# Patient Record
Sex: FEMALE | Race: WHITE | NOT HISPANIC OR LATINO | URBAN - METROPOLITAN AREA
[De-identification: names, ages, dates, MRNs, and addresses within clinical notes are randomized per-mention and may not be internally consistent; named-entity substitution may affect disease eponyms.]

---

## 2019-02-06 VITALS
TEMPERATURE: 97.4 F | DIASTOLIC BLOOD PRESSURE: 102 MMHG | DIASTOLIC BLOOD PRESSURE: 70 MMHG | RESPIRATION RATE: 16 BRPM | HEART RATE: 89 BPM | OXYGEN SATURATION: 99 % | DIASTOLIC BLOOD PRESSURE: 78 MMHG | SYSTOLIC BLOOD PRESSURE: 177 MMHG | OXYGEN SATURATION: 98 % | DIASTOLIC BLOOD PRESSURE: 76 MMHG | RESPIRATION RATE: 24 BRPM | HEIGHT: 62 IN | OXYGEN SATURATION: 97 % | TEMPERATURE: 98 F | DIASTOLIC BLOOD PRESSURE: 81 MMHG | DIASTOLIC BLOOD PRESSURE: 71 MMHG | OXYGEN SATURATION: 100 % | SYSTOLIC BLOOD PRESSURE: 119 MMHG | DIASTOLIC BLOOD PRESSURE: 88 MMHG | RESPIRATION RATE: 17 BRPM | HEART RATE: 73 BPM | HEART RATE: 77 BPM | SYSTOLIC BLOOD PRESSURE: 164 MMHG | WEIGHT: 138 LBS | SYSTOLIC BLOOD PRESSURE: 145 MMHG | SYSTOLIC BLOOD PRESSURE: 144 MMHG | HEART RATE: 83 BPM | HEART RATE: 91 BPM | RESPIRATION RATE: 20 BRPM | SYSTOLIC BLOOD PRESSURE: 136 MMHG | OXYGEN SATURATION: 89 % | SYSTOLIC BLOOD PRESSURE: 156 MMHG | HEART RATE: 79 BPM

## 2019-02-06 PROBLEM — K21.9 GERD - GASTROESOPHAGEAL REFLUX DISEASE: Status: ACTIVE | Noted: 2019-02-07

## 2019-02-06 PROBLEM — K30 DYSPEPSIA: Status: ACTIVE | Noted: 2019-02-07

## 2019-02-07 ENCOUNTER — AMBULATORY SURGICAL CENTER (AMBULATORY)
Dept: URBAN - METROPOLITAN AREA SURGERY 17 | Facility: SURGERY | Age: 79
End: 2019-02-07
Payer: COMMERCIAL

## 2019-02-07 ENCOUNTER — OFFICE (AMBULATORY)
Dept: URBAN - METROPOLITAN AREA PATHOLOGY 4 | Facility: PATHOLOGY | Age: 79
End: 2019-02-07
Payer: COMMERCIAL

## 2019-02-07 DIAGNOSIS — K22.2 ESOPHAGEAL OBSTRUCTION: ICD-10-CM

## 2019-02-07 DIAGNOSIS — K21.9 GASTRO-ESOPHAGEAL REFLUX DISEASE WITHOUT ESOPHAGITIS: ICD-10-CM

## 2019-02-07 DIAGNOSIS — K21.0 GASTRO-ESOPHAGEAL REFLUX DISEASE WITH ESOPHAGITIS: ICD-10-CM

## 2019-02-07 DIAGNOSIS — R13.10 DYSPHAGIA, UNSPECIFIED: ICD-10-CM

## 2019-02-07 DIAGNOSIS — K30 FUNCTIONAL DYSPEPSIA: ICD-10-CM

## 2019-02-07 LAB
GI HISTOLOGY: A. UNSPECIFIED: (no result)
GI HISTOLOGY: PDF REPORT: (no result)

## 2019-02-07 PROCEDURE — 43239 EGD BIOPSY SINGLE/MULTIPLE: CPT | Performed by: INTERNAL MEDICINE

## 2019-02-07 PROCEDURE — 88305 TISSUE EXAM BY PATHOLOGIST: CPT | Performed by: INTERNAL MEDICINE

## 2019-02-07 PROCEDURE — 43450 DILATE ESOPHAGUS 1/MULT PASS: CPT | Performed by: INTERNAL MEDICINE

## 2019-02-27 ENCOUNTER — OFFICE (AMBULATORY)
Dept: URBAN - METROPOLITAN AREA CLINIC 75 | Facility: CLINIC | Age: 79
End: 2019-02-27
Payer: COMMERCIAL

## 2019-02-27 VITALS
SYSTOLIC BLOOD PRESSURE: 130 MMHG | DIASTOLIC BLOOD PRESSURE: 70 MMHG | HEART RATE: 70 BPM | HEIGHT: 62 IN | WEIGHT: 143 LBS

## 2019-02-27 DIAGNOSIS — K21.9 GASTRO-ESOPHAGEAL REFLUX DISEASE WITHOUT ESOPHAGITIS: ICD-10-CM

## 2019-02-27 DIAGNOSIS — R49.0 DYSPHONIA: ICD-10-CM

## 2019-02-27 DIAGNOSIS — K22.2 ESOPHAGEAL OBSTRUCTION: ICD-10-CM

## 2019-02-27 PROBLEM — R13.10 DYSPHAGIA: Status: ACTIVE | Noted: 2019-02-07

## 2019-02-27 PROCEDURE — 99214 OFFICE O/P EST MOD 30 MIN: CPT

## 2019-02-27 RX ORDER — RANITIDINE 150 MG/1
300 TABLET ORAL
Qty: 180 | Refills: 3 | Status: ACTIVE
Start: 2019-02-27

## 2021-02-05 ENCOUNTER — IMMUNIZATION (OUTPATIENT)
Dept: VACCINE CLINIC | Facility: HOSPITAL | Age: 81
End: 2021-02-05

## 2021-02-05 PROCEDURE — 91300 HC SARSCOV02 VAC 30MCG/0.3ML IM: CPT | Performed by: INTERNAL MEDICINE

## 2021-02-05 PROCEDURE — 0001A: CPT | Performed by: INTERNAL MEDICINE

## 2021-02-26 ENCOUNTER — IMMUNIZATION (OUTPATIENT)
Dept: VACCINE CLINIC | Facility: HOSPITAL | Age: 81
End: 2021-02-26

## 2021-02-26 PROCEDURE — 0002A: CPT | Performed by: INTERNAL MEDICINE

## 2021-02-26 PROCEDURE — 91300 HC SARSCOV02 VAC 30MCG/0.3ML IM: CPT | Performed by: INTERNAL MEDICINE

## 2021-08-10 ENCOUNTER — TRANSCRIBE ORDERS (OUTPATIENT)
Dept: ADMINISTRATIVE | Facility: HOSPITAL | Age: 81
End: 2021-08-10

## 2021-08-10 DIAGNOSIS — Z13.6 ENCOUNTER FOR SCREENING FOR VASCULAR DISEASE: Primary | ICD-10-CM

## 2021-09-11 ENCOUNTER — IMMUNIZATION (OUTPATIENT)
Dept: VACCINE CLINIC | Facility: HOSPITAL | Age: 81
End: 2021-09-11

## 2021-09-11 PROCEDURE — 91300 HC SARSCOV02 VAC 30MCG/0.3ML IM: CPT | Performed by: INTERNAL MEDICINE

## 2021-09-11 PROCEDURE — 0003A: CPT | Performed by: INTERNAL MEDICINE

## 2022-01-11 ENCOUNTER — HOSPITAL ENCOUNTER (OUTPATIENT)
Dept: CARDIOLOGY | Facility: HOSPITAL | Age: 82
Discharge: HOME | End: 2022-01-11
Admitting: SURGERY

## 2022-01-11 VITALS
HEART RATE: 91 BPM | BODY MASS INDEX: 26.62 KG/M2 | DIASTOLIC BLOOD PRESSURE: 78 MMHG | SYSTOLIC BLOOD PRESSURE: 131 MMHG | WEIGHT: 141 LBS | HEIGHT: 61 IN

## 2022-01-11 DIAGNOSIS — Z13.6 ENCOUNTER FOR SCREENING FOR VASCULAR DISEASE: ICD-10-CM

## 2022-01-11 LAB
BH CV ECHO MEAS - DIST AO DIAM: 1.35 CM
BH CV VAS BP LEFT ARM: NORMAL MMHG
BH CV VAS BP RIGHT ARM: NORMAL MMHG
BH CV XLRA MEAS - MID AO DIAM: 1.59 CM
BH CV XLRA MEAS - PAD LEFT ABI DP: 1.05
BH CV XLRA MEAS - PAD LEFT ABI PT: 1.13
BH CV XLRA MEAS - PAD LEFT ARM: 131 MMHG
BH CV XLRA MEAS - PAD LEFT LEG DP: 138 MMHG
BH CV XLRA MEAS - PAD LEFT LEG PT: 148 MMHG
BH CV XLRA MEAS - PAD RIGHT ABI DP: 1.07
BH CV XLRA MEAS - PAD RIGHT ABI PT: 1.14
BH CV XLRA MEAS - PAD RIGHT ARM: 130 MMHG
BH CV XLRA MEAS - PAD RIGHT LEG DP: 141 MMHG
BH CV XLRA MEAS - PAD RIGHT LEG PT: 150 MMHG
BH CV XLRA MEAS - PROX AO DIAM: 1.82 CM
BH CV XLRA MEAS LEFT ICA/CCA RATIO: 1.01
BH CV XLRA MEAS LEFT MID CCA PSV: NORMAL CM/SEC
BH CV XLRA MEAS LEFT MID ICA PSV: NORMAL CM/SEC
BH CV XLRA MEAS LEFT PROX ECA PSV: NORMAL CM/SEC
BH CV XLRA MEAS RIGHT ICA/CCA RATIO: 0.98
BH CV XLRA MEAS RIGHT MID CCA PSV: NORMAL CM/SEC
BH CV XLRA MEAS RIGHT MID ICA PSV: NORMAL CM/SEC
BH CV XLRA MEAS RIGHT PROX ECA PSV: NORMAL CM/SEC

## 2022-01-11 PROCEDURE — 93799 UNLISTED CV SVC/PROCEDURE: CPT

## 2023-07-13 PROBLEM — Z96.649 HIP JOINT REPLACEMENT STATUS: Status: ACTIVE | Noted: 2023-07-13

## 2024-09-30 ENCOUNTER — HOSPITAL ENCOUNTER (INPATIENT)
Facility: HOSPITAL | Age: 84
LOS: 1 days | Discharge: HOME | DRG: 287 | End: 2024-10-03
Attending: EMERGENCY MEDICINE | Admitting: INTERNAL MEDICINE
Payer: MEDICARE

## 2024-09-30 ENCOUNTER — APPOINTMENT (OUTPATIENT)
Dept: GENERAL RADIOLOGY | Facility: HOSPITAL | Age: 84
DRG: 287 | End: 2024-09-30
Payer: MEDICARE

## 2024-09-30 DIAGNOSIS — R07.9 CHEST PAIN, UNSPECIFIED TYPE: Primary | ICD-10-CM

## 2024-09-30 DIAGNOSIS — R11.0 NAUSEA: ICD-10-CM

## 2024-09-30 DIAGNOSIS — I10 HYPERTENSION, UNSPECIFIED TYPE: ICD-10-CM

## 2024-09-30 LAB
ALBUMIN SERPL-MCNC: 4.3 G/DL (ref 3.5–5.2)
ALBUMIN/GLOB SERPL: 1.6 G/DL
ALP SERPL-CCNC: 51 U/L (ref 39–117)
ALT SERPL W P-5'-P-CCNC: 11 U/L (ref 1–33)
ANION GAP SERPL CALCULATED.3IONS-SCNC: 10.1 MMOL/L (ref 5–15)
AST SERPL-CCNC: 16 U/L (ref 1–32)
BASOPHILS # BLD AUTO: 0.04 10*3/MM3 (ref 0–0.2)
BASOPHILS NFR BLD AUTO: 0.6 % (ref 0–1.5)
BILIRUB SERPL-MCNC: 0.3 MG/DL (ref 0–1.2)
BUN SERPL-MCNC: 16 MG/DL (ref 8–23)
BUN/CREAT SERPL: 13.2 (ref 7–25)
CALCIUM SPEC-SCNC: 9.4 MG/DL (ref 8.6–10.5)
CHLORIDE SERPL-SCNC: 106 MMOL/L (ref 98–107)
CO2 SERPL-SCNC: 24.9 MMOL/L (ref 22–29)
CREAT SERPL-MCNC: 1.21 MG/DL (ref 0.57–1)
DEPRECATED RDW RBC AUTO: 49.4 FL (ref 37–54)
EGFRCR SERPLBLD CKD-EPI 2021: 44.6 ML/MIN/1.73
EOSINOPHIL # BLD AUTO: 0.05 10*3/MM3 (ref 0–0.4)
EOSINOPHIL NFR BLD AUTO: 0.7 % (ref 0.3–6.2)
ERYTHROCYTE [DISTWIDTH] IN BLOOD BY AUTOMATED COUNT: 13.1 % (ref 12.3–15.4)
GEN 5 2HR TROPONIN T REFLEX: 10 NG/L
GLOBULIN UR ELPH-MCNC: 2.7 GM/DL
GLUCOSE SERPL-MCNC: 105 MG/DL (ref 65–99)
HCT VFR BLD AUTO: 41.3 % (ref 34–46.6)
HGB BLD-MCNC: 13 G/DL (ref 12–15.9)
HOLD SPECIMEN: NORMAL
HOLD SPECIMEN: NORMAL
IMM GRANULOCYTES # BLD AUTO: 0.02 10*3/MM3 (ref 0–0.05)
IMM GRANULOCYTES NFR BLD AUTO: 0.3 % (ref 0–0.5)
LYMPHOCYTES # BLD AUTO: 1.17 10*3/MM3 (ref 0.7–3.1)
LYMPHOCYTES NFR BLD AUTO: 17.3 % (ref 19.6–45.3)
MCH RBC QN AUTO: 31.9 PG (ref 26.6–33)
MCHC RBC AUTO-ENTMCNC: 31.5 G/DL (ref 31.5–35.7)
MCV RBC AUTO: 101.5 FL (ref 79–97)
MONOCYTES # BLD AUTO: 0.53 10*3/MM3 (ref 0.1–0.9)
MONOCYTES NFR BLD AUTO: 7.9 % (ref 5–12)
NEUTROPHILS NFR BLD AUTO: 4.94 10*3/MM3 (ref 1.7–7)
NEUTROPHILS NFR BLD AUTO: 73.2 % (ref 42.7–76)
NRBC BLD AUTO-RTO: 0 /100 WBC (ref 0–0.2)
PLATELET # BLD AUTO: 233 10*3/MM3 (ref 140–450)
PMV BLD AUTO: 9.5 FL (ref 6–12)
POTASSIUM SERPL-SCNC: 4.6 MMOL/L (ref 3.5–5.2)
PROT SERPL-MCNC: 7 G/DL (ref 6–8.5)
RBC # BLD AUTO: 4.07 10*6/MM3 (ref 3.77–5.28)
SODIUM SERPL-SCNC: 141 MMOL/L (ref 136–145)
TROPONIN T DELTA: 1 NG/L
TROPONIN T SERPL HS-MCNC: 9 NG/L
WBC NRBC COR # BLD AUTO: 6.75 10*3/MM3 (ref 3.4–10.8)
WHOLE BLOOD HOLD COAG: NORMAL
WHOLE BLOOD HOLD SPECIMEN: NORMAL

## 2024-09-30 PROCEDURE — 84484 ASSAY OF TROPONIN QUANT: CPT | Performed by: EMERGENCY MEDICINE

## 2024-09-30 PROCEDURE — 71045 X-RAY EXAM CHEST 1 VIEW: CPT

## 2024-09-30 PROCEDURE — 93010 ELECTROCARDIOGRAM REPORT: CPT | Performed by: INTERNAL MEDICINE

## 2024-09-30 PROCEDURE — 99285 EMERGENCY DEPT VISIT HI MDM: CPT

## 2024-09-30 PROCEDURE — 80053 COMPREHEN METABOLIC PANEL: CPT | Performed by: EMERGENCY MEDICINE

## 2024-09-30 PROCEDURE — G0378 HOSPITAL OBSERVATION PER HR: HCPCS

## 2024-09-30 PROCEDURE — 93005 ELECTROCARDIOGRAM TRACING: CPT

## 2024-09-30 PROCEDURE — 85025 COMPLETE CBC W/AUTO DIFF WBC: CPT

## 2024-09-30 PROCEDURE — 93005 ELECTROCARDIOGRAM TRACING: CPT | Performed by: EMERGENCY MEDICINE

## 2024-09-30 PROCEDURE — 36415 COLL VENOUS BLD VENIPUNCTURE: CPT

## 2024-09-30 RX ORDER — AMOXICILLIN 250 MG
2 CAPSULE ORAL 2 TIMES DAILY PRN
Status: DISCONTINUED | OUTPATIENT
Start: 2024-09-30 | End: 2024-10-03 | Stop reason: HOSPADM

## 2024-09-30 RX ORDER — ACETAMINOPHEN 500 MG
500 TABLET ORAL EVERY 6 HOURS PRN
Status: DISCONTINUED | OUTPATIENT
Start: 2024-09-30 | End: 2024-10-02

## 2024-09-30 RX ORDER — ROSUVASTATIN CALCIUM 10 MG/1
10 TABLET, COATED ORAL NIGHTLY
Status: DISCONTINUED | OUTPATIENT
Start: 2024-09-30 | End: 2024-10-03 | Stop reason: HOSPADM

## 2024-09-30 RX ORDER — ASPIRIN 325 MG
325 TABLET ORAL ONCE
Status: COMPLETED | OUTPATIENT
Start: 2024-09-30 | End: 2024-09-30

## 2024-09-30 RX ORDER — ONDANSETRON 2 MG/ML
4 INJECTION INTRAMUSCULAR; INTRAVENOUS EVERY 6 HOURS PRN
Status: DISCONTINUED | OUTPATIENT
Start: 2024-09-30 | End: 2024-10-02

## 2024-09-30 RX ORDER — SODIUM CHLORIDE 9 MG/ML
40 INJECTION, SOLUTION INTRAVENOUS AS NEEDED
Status: DISCONTINUED | OUTPATIENT
Start: 2024-09-30 | End: 2024-10-03 | Stop reason: HOSPADM

## 2024-09-30 RX ORDER — SODIUM CHLORIDE 0.9 % (FLUSH) 0.9 %
10 SYRINGE (ML) INJECTION AS NEEDED
Status: DISCONTINUED | OUTPATIENT
Start: 2024-09-30 | End: 2024-10-03 | Stop reason: HOSPADM

## 2024-09-30 RX ORDER — SODIUM CHLORIDE 0.9 % (FLUSH) 0.9 %
10 SYRINGE (ML) INJECTION EVERY 12 HOURS SCHEDULED
Status: DISCONTINUED | OUTPATIENT
Start: 2024-09-30 | End: 2024-10-03 | Stop reason: HOSPADM

## 2024-09-30 RX ORDER — CHOLECALCIFEROL (VITAMIN D3) 25 MCG
1000 TABLET ORAL EVERY MORNING
Status: DISCONTINUED | OUTPATIENT
Start: 2024-10-01 | End: 2024-10-03 | Stop reason: HOSPADM

## 2024-09-30 RX ORDER — PANTOPRAZOLE SODIUM 40 MG/1
40 TABLET, DELAYED RELEASE ORAL
Status: DISCONTINUED | OUTPATIENT
Start: 2024-10-01 | End: 2024-10-03 | Stop reason: HOSPADM

## 2024-09-30 RX ORDER — ONDANSETRON 4 MG/1
4 TABLET, ORALLY DISINTEGRATING ORAL EVERY 6 HOURS PRN
Status: DISCONTINUED | OUTPATIENT
Start: 2024-09-30 | End: 2024-10-02

## 2024-09-30 RX ORDER — BISACODYL 10 MG
10 SUPPOSITORY, RECTAL RECTAL DAILY PRN
Status: DISCONTINUED | OUTPATIENT
Start: 2024-09-30 | End: 2024-10-03 | Stop reason: HOSPADM

## 2024-09-30 RX ORDER — BISACODYL 5 MG/1
5 TABLET, DELAYED RELEASE ORAL DAILY PRN
Status: DISCONTINUED | OUTPATIENT
Start: 2024-09-30 | End: 2024-10-03 | Stop reason: HOSPADM

## 2024-09-30 RX ORDER — POLYETHYLENE GLYCOL 3350 17 G/17G
17 POWDER, FOR SOLUTION ORAL DAILY PRN
Status: DISCONTINUED | OUTPATIENT
Start: 2024-09-30 | End: 2024-10-03 | Stop reason: HOSPADM

## 2024-09-30 RX ORDER — NITROGLYCERIN 0.4 MG/1
0.4 TABLET SUBLINGUAL
Status: DISCONTINUED | OUTPATIENT
Start: 2024-09-30 | End: 2024-10-03 | Stop reason: HOSPADM

## 2024-09-30 RX ADMIN — ROSUVASTATIN CALCIUM 10 MG: 20 TABLET, FILM COATED ORAL at 21:25

## 2024-09-30 RX ADMIN — Medication 10 ML: at 21:27

## 2024-09-30 RX ADMIN — SERTRALINE 50 MG: 50 TABLET, FILM COATED ORAL at 21:25

## 2024-09-30 RX ADMIN — ASPIRIN 325 MG: 325 TABLET ORAL at 15:39

## 2024-09-30 NOTE — Clinical Note
Patient was not available for their therapy session at this time.  Reason not seen: Patient requesting no therapy today (06/16/17 1700).    Re-Attempt Plan: Will re-attempt tomorrow (06/16/17 1700).    Pt sleeping soundly needing tactile stimulation to wake up. Upon waking and learning writer's role as PT, pt stating, \"I am at my worst right now, this pain is right in the middle of my legs\". Pt rating pain at 7/10 and stating PT \"will not work tonight\". Pt agreeable to session tomorrow.    inserted over wire.

## 2024-09-30 NOTE — ED NOTES
Nursing report ED to floor  Caryl Rae  84 y.o.  female    HPI :  HPI (Adult)  Stated Reason for Visit: cp  History Obtained From: patient    Chief Complaint  Chief Complaint   Patient presents with    Chest Pain   Caryl Rae is a 84 y.o. female who presents to the ED c/o sudden onset of chest pain that began this morning.  She states that she was in her home about to feed her dog when she started having heavy/dull chest discomfort to the central to left side of her chest that radiated into her jaw and down her left arm.  She did have associated diaphoresis and nausea at that time as well.  She denied any shortness of breath however.  She stated that the pain lasted roughly about an hour before fully subsided.  She had an appointment with her primary care doctor today and when she discussed the chest pain with them, they recommended that she come to the ED for evaluation.  Currently, she is at her baseline state of health without any physical complaints.  She states that her last stress test was roughly 20 years ago.       Admitting doctor:   Jeffrey Chandler MD    Admitting diagnosis:   The primary encounter diagnosis was Chest pain, unspecified type. Diagnoses of Nausea and Hypertension, unspecified type were also pertinent to this visit.    Code status:   Current Code Status       Date Active Code Status Order ID Comments User Context       9/30/2024 1756 CPR (Attempt to Resuscitate) [id number]  Mateo Rehman III, PA ED        Question Answer    Code Status (Patient has no pulse and is not breathing) CPR (Attempt to Resuscitate)    Medical Interventions (Patient has pulse or is breathing) Full Support    Level Of Support Discussed With Patient                    Allergies:   Patient has no known allergies.    Isolation:   No active isolations    Intake and Output  No intake or output data in the 24 hours ending 09/30/24 1753    Weight:   There were no vitals filed for this visit.    Most  recent vitals:   Vitals:    09/30/24 1701 09/30/24 1702 09/30/24 1731 09/30/24 1732   BP: 155/79  156/79    Pulse: 62 68 70 74   Resp:       Temp:       TempSrc:       SpO2: 96% 93% 97% 96%       Active LDAs/IV Access:   Lines, Drains & Airways       Active LDAs       Name Placement date Placement time Site Days    Peripheral IV 09/30/24 1650 Anterior;Left Wrist 09/30/24  1650  Wrist  less than 1                    Labs (abnormal labs have a star):   Labs Reviewed   COMPREHENSIVE METABOLIC PANEL - Abnormal; Notable for the following components:       Result Value    Glucose 105 (*)     Creatinine 1.21 (*)     eGFR 44.6 (*)     All other components within normal limits    Narrative:     GFR Normal >60  Chronic Kidney Disease <60  Kidney Failure <15    The GFR formula is only valid for adults with stable renal function between ages 18 and 70.   CBC WITH AUTO DIFFERENTIAL - Abnormal; Notable for the following components:    .5 (*)     Lymphocyte % 17.3 (*)     All other components within normal limits   TROPONIN - Normal    Narrative:     High Sensitive Troponin T Reference Range:  <14.0 ng/L- Negative Female for AMI  <22.0 ng/L- Negative Male for AMI  >=14 - Abnormal Female indicating possible myocardial injury.  >=22 - Abnormal Male indicating possible myocardial injury.   Clinicians would have to utilize clinical acumen, EKG, Troponin, and serial changes to determine if it is an Acute Myocardial Infarction or myocardial injury due to an underlying chronic condition.        HIGH SENSITIVITIY TROPONIN T 2HR - Normal    Narrative:     High Sensitive Troponin T Reference Range:  <14.0 ng/L- Negative Female for AMI  <22.0 ng/L- Negative Male for AMI  >=14 - Abnormal Female indicating possible myocardial injury.  >=22 - Abnormal Male indicating possible myocardial injury.   Clinicians would have to utilize clinical acumen, EKG, Troponin, and serial changes to determine if it is an Acute Myocardial Infarction or  myocardial injury due to an underlying chronic condition.        RAINBOW DRAW    Narrative:     The following orders were created for panel order [place] Draw.  Procedure                               Abnormality         Status                     ---------                               -----------         ------                     Green Top (Gel)[[id number]]                                  Final result               Lavender Top[[id number]]                                     Final result               Gold Top - SST[[id number]]                                   Final result               Light Blue Top[[id number]]                                   Final result                 Please view results for these tests on the individual orders.   CBC AND DIFFERENTIAL    Narrative:     The following orders were created for panel order CBC & Differential.  Procedure                               Abnormality         Status                     ---------                               -----------         ------                     CBC Auto Differential[[id number]]        Abnormal            Final result                 Please view results for these tests on the individual orders.   GREEN TOP   LAVENDER TOP   GOLD TOP - SST   LIGHT BLUE TOP       EKG:   ECG 12 Lead ED Triage Standing Order; Chest Pain   Preliminary Result   HEART RATE=74  bpm   RR Fzdzpqiy=404  ms   WI Krftqtdy=080  ms   P Horizontal Axis=-5  deg   P Front Axis=29  deg   QRSD Interval=81  ms   QT Wppnvddn=480  ms   CXhK=771  ms   QRS Axis=3  deg   T Wave Axis=13  deg   - ABNORMAL ECG -   Sinus rhythm   Left ventricular hypertrophy   Date and Time of Study:2024-09-30 14:55:54          Meds given in ED:   Medications   sodium chloride 0.9 % flush 10 mL (has no administration in time range)   sodium chloride 0.9 % flush 10 mL (has no administration in time range)   sodium chloride 0.9 % flush 10 mL (has no administration in time range)   sodium chloride 0.9 % infusion  40 mL (has no administration in time range)   ondansetron ODT (ZOFRAN-ODT) disintegrating tablet 4 mg (has no administration in time range)     Or   ondansetron (ZOFRAN) injection 4 mg (has no administration in time range)   nitroglycerin (NITROSTAT) SL tablet 0.4 mg (has no administration in time range)   sennosides-docusate (PERICOLACE) 8.6-50 MG per tablet 2 tablet (has no administration in time range)     And   polyethylene glycol (MIRALAX) packet 17 g (has no administration in time range)     And   bisacodyl (DULCOLAX) EC tablet 5 mg (has no administration in time range)     And   bisacodyl (DULCOLAX) suppository 10 mg (has no administration in time range)   aspirin tablet 325 mg (325 mg Oral Given 9/30/24 1539)       Imaging results:  XR Chest 1 View    Result Date: 9/30/2024  No focal pulmonary consolidation. Cardiomegaly. Follow-up as clinical indications persist.  This report was finalized on 9/30/2024 3:24 PM by Dr. Markel Valentin M.D on Workstation: [employer]       Ambulatory status:   - adlib    Social issues:   Social History     Socioeconomic History    Marital status:    Tobacco Use    Smoking status: Never    Smokeless tobacco: Never   Vaping Use    Vaping status: Never Used   Substance and Sexual Activity    Alcohol use: Never    Drug use: Never       Peripheral Neurovascular  Peripheral Neurovascular (Adult)  Peripheral Neurovascular WDL: WDL    Neuro Cognitive  Neuro Cognitive (Adult)  Cognitive/Neuro/Behavioral WDL: WDL  Level of Consciousness: Alert  Arousal Level: opens eyes spontaneously  Orientation: oriented x 4  Speech: clear, spontaneous  Mood/Behavior: calm, uncooperative    Learning  Learning Assessment (Adult)  Learning Readiness and Ability: no barriers identified    Respiratory  Respiratory WDL  Respiratory WDL: WDL    Abdominal Pain       Pain Assessments  Pain (Adult)  (0-10) Pain Rating: Rest: 0  Pain Management Interventions: see MAR, quiet environment  facilitated  Response to Pain Interventions: interventions effective per patient    NIH Stroke Scale       Calli Dickens RN  09/30/24 17:59 EDT

## 2024-09-30 NOTE — H&P
[facility]   HISTORY AND PHYSICAL    Patient Name: Caryl Rae  : 1940  MRN: [id number]  Primary Care Physician:  Maida Chavez MD  Date of admission: 2024    Subjective   Subjective     Chief Complaint:   Chief Complaint   Patient presents with    Chest Pain         HPI:    Caryl Rae is a 84 y.o. female with significant past medical history of hyperlipidemia, arthritis who presents to the ED with sudden onset of chest pain began this m patient reports she was at home about to feed her dog and started having heavy/dull chest discomfort to the center left side of her chest that radiated into her jaw and down her left arm.  She did have associated diaphoresis and nausea that occurred simultaneously.  Pain lasted roughly an hour prior to subsiding.  She had an appoint with her primary care doctor today and we discussed the chest pain with the primary care doctor was referred to the ED for further evaluation.      In the ED patient had high-sensitivity troponins of 9 and 10.  Sodium was 149, potassium 4.6, creatinine 1.21 which is slightly above her baseline which appeared to been 0.8 on 2023.  WBC 6.75, hemoglobin 13, platelets 233.  Chest x-ray showed cardiomegaly but no acute infiltrates.    Review of Systems   All systems were reviewed and negative except for: That listed above    Personal History     Past Medical History:   Diagnosis Date    Arthritis     GERD (gastroesophageal reflux disease)     History of cancer chemotherapy 2003    History of radiation therapy 2003    History of right breast cancer 2003    Hyperlipidemia     Lung nodule     MD following    Urinary incontinence     wears pads       Past Surgical History:   Procedure Laterality Date    BREAST LUMPECTOMY Right 2003    cancer    COLONOSCOPY      ENDOSCOPY      HIP ARTHROPLASTY Right     HYSTERECTOMY      KNEE ARTHROPLASTY Bilateral     LUMBAR DISC SURGERY      TOTAL HIP ARTHROPLASTY Left 2023     Procedure: TOTAL HIP ARTHROPLASTY ANTERIOR WITH HANA TABLE;  Surgeon: Bunny Tony II, MD;  [facility];  Service: Orthopedics;  Laterality: Left;       Family History: family history is not on file. Otherwise pertinent FHx was reviewed and not pertinent to current issue.    Social History:  reports that she has never smoked. She has never used smokeless tobacco. She reports that she does not drink alcohol and does not use drugs.    Home Medications:  Chlorhexidine Gluconate, HYDROcodone-acetaminophen, Melatonin, Ocuvite Adult 50+, Vitamin D (Cholecalciferol), acetaminophen, cyclobenzaprine, ibandronate, omeprazole, rosuvastatin, and sertraline    Allergies:  No Known Allergies    Objective   Objective     Vitals:   Temp:  [97.5 °F (36.4 °C)] 97.5 °F (36.4 °C)  Heart Rate:  [62-88] 70  Resp:  [16] 16  BP: (152-163)/(69-83) 163/78  Physical Exam    Constitutional: Awake, alert   Eyes: PERRLA, sclerae anicteric, no conjunctival injection   HENT: NCAT, mucous membranes moist   Neck: Supple, no thyromegaly, no lymphadenopathy, trachea midline   Respiratory: Clear to auscultation bilaterally, nonlabored respirations    Cardiovascular: RRR, no murmurs, rubs, or gallops, palpable pedal pulses bilaterally   Gastrointestinal: Positive bowel sounds, soft, nontender, nondistended   Musculoskeletal: No bilateral ankle edema, no clubbing or cyanosis to extremities   Psychiatric: Appropriate affect, cooperative   Neurologic: Oriented x 3, strength symmetric in all extremities, Cranial Nerves grossly intact to confrontation, speech clear   Skin: No rashes     Result Review    Result Review:  I have personally reviewed the results from the time of this admission to 9/30/2024 19:01 EDT and agree with these findings:  [x]  Laboratory list / accordion  []  Microbiology  [x]  Radiology  [x]  EKG/Telemetry   []  Cardiology/Vascular   []  Pathology  []  Old records  []  Other:        Assessment & Plan   Assessment  / Plan     Brief Patient Summary:  Caryl Rae is a 84 y.o. female who was admitted to the ED observation unit for further evaluation of chest pain.  Chest pain was described as being left-sided substernal radiating to the left arm and neck.  Initial troponin is negative.  ECG unremarkable.  Heart score 5.  Patient admitted to the ED for further evaluation.    Active Hospital Problems:  Active Hospital Problems    Diagnosis     **Chest pain      Plan:     Chest pain  -TTE echo  -Continuous cardiac monitoring  -Cardiology consultation  -Nitro morphine as needed for pain    Dyslipidemia  -Continue with rosuvastatin    GERD  -Continue with PPI    Depression  -Continue with paroxetine      VTE Prophylaxis:  Mechanical VTE prophylaxis orders are present.        CODE STATUS:    Level Of Support Discussed With: Patient  Code Status (Patient has no pulse and is not breathing): CPR (Attempt to Resuscitate)  Medical Interventions (Patient has pulse or is breathing): Full Support    Admission Status:  I believe this patient meets observation status.    Electronically signed by Mateo Rehman III, PA, 09/30/24, 6:55 PM EDT.        75 minutes has been spent by [facility] providers in the care of this patient while under observation status      I have worn appropriate PPE during this patient encounter, sanitized my hands both with entering and exiting patient's room.

## 2024-09-30 NOTE — Clinical Note
Hemostasis started on the right radial artery. R-Band was used in achieving hemostasis. Radial compression device applied to vessel. Hemostasis achieved successfully. Closure device additional comment: Tr band applied by Shashank GIBSON    13 cc

## 2024-09-30 NOTE — Clinical Note
Allergies reviewed.  H&P note has been confirmed for the patient. Procedural consent has been signed.  Blood consent has not been signed. Staff has reviewed the patient's labs.  The patient has denied she is pregnant.

## 2024-09-30 NOTE — ED PROVIDER NOTES
EMERGENCY DEPARTMENT ENCOUNTER    Room Number:  34/34  PCP: Maida Chavez MD  Historian: Patient      HPI:  Chief Complaint: Chest pain  A complete HPI/ROS/PMH/PSH/SH/FH are unobtainable due to: None  Context: Caryl Rae is a 84 y.o. female who presents to the ED c/o sudden onset of chest pain that began this morning.  She states that she was in her home about to feed her dog when she started having heavy/dull chest discomfort to the central to left side of her chest that radiated into her jaw and down her left arm.  She did have associated diaphoresis and nausea at that time as well.  She denied any shortness of breath however.  She stated that the pain lasted roughly about an hour before fully subsided.  She had an appointment with her primary care doctor today and when she discussed the chest pain with them, they recommended that she come to the ED for evaluation.  Currently, she is at her baseline state of health without any physical complaints.  She states that her last stress test was roughly 20 years ago.            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     Resolved Ambulatory Problems     Diagnosis Date Noted    No Resolved Ambulatory Problems     Past Medical History:   Diagnosis Date    Arthritis     GERD (gastroesophageal reflux disease)     History of cancer chemotherapy 2003    History of radiation therapy 2003    History of right breast cancer 2003    Hyperlipidemia     Lung nodule     Urinary incontinence          PAST SURGICAL HISTORY  Past Surgical History:   Procedure Laterality Date    BREAST LUMPECTOMY Right 2003    cancer    COLONOSCOPY      ENDOSCOPY      HIP ARTHROPLASTY Right     HYSTERECTOMY      KNEE ARTHROPLASTY Bilateral     LUMBAR DISC SURGERY      TOTAL HIP ARTHROPLASTY Left 7/13/2023    Procedure: TOTAL HIP ARTHROPLASTY ANTERIOR WITH HANA TABLE;  Surgeon: Bunny Tony II, MD;  Location:  [facility];  Service: Orthopedics;  Laterality: Left;         FAMILY HISTORY  Family History   Problem Relation Age of Onset    Malig Hyperthermia Neg Hx          SOCIAL HISTORY  Social History     Socioeconomic History    Marital status:    Tobacco Use    Smoking status: Never    Smokeless tobacco: Never   Vaping Use    Vaping status: Never Used   Substance and Sexual Activity    Alcohol use: Never    Drug use: Never         ALLERGIES  Patient has no known allergies.        REVIEW OF SYSTEMS  Review of Systems   Constitutional:  Positive for diaphoresis. Negative for fever.   HENT:  Negative for sore throat.    Eyes: Negative.    Respiratory:  Negative for cough and shortness of breath.    Cardiovascular:  Positive for chest pain.   Gastrointestinal:  Positive for nausea. Negative for abdominal pain, diarrhea and vomiting.   Genitourinary:  Negative for dysuria.   Musculoskeletal:  Negative for neck pain.   Skin:  Negative for rash.   Allergic/Immunologic: Negative.    Neurological:  Negative for weakness, numbness and headaches.   Hematological: Negative.    Psychiatric/Behavioral: Negative.     All other systems reviewed and are negative.         PHYSICAL EXAM  ED Triage Vitals   Temp Heart Rate Resp BP SpO2   09/30/24 1453 09/30/24 1453 09/30/24 1453 09/30/24 1458 09/30/24 1453   97.5 °F (36.4 °C) 88 16 153/71 96 %      Temp src Heart Rate Source Patient Position BP Location FiO2 (%)   09/30/24 1453 09/30/24 1453 -- -- --   Tympanic Monitor          Physical Exam  Constitutional:       General: She is not in acute distress.     Appearance: Normal appearance. She is not ill-appearing or toxic-appearing.   HENT:      Head: Normocephalic and atraumatic.   Eyes:      Extraocular Movements: Extraocular movements intact.      Pupils: Pupils are equal, round, and reactive to light.   Cardiovascular:      Rate and Rhythm: Normal rate and regular rhythm.      Heart sounds: Murmur heard.      No friction rub. No  gallop.   Pulmonary:      Effort: Pulmonary effort is normal.      Breath sounds: Normal breath sounds.   Abdominal:      General: Abdomen is flat. There is no distension.      Palpations: Abdomen is soft.      Tenderness: There is no abdominal tenderness.   Musculoskeletal:         General: No swelling or tenderness. Normal range of motion.      Cervical back: Normal range of motion and neck supple.   Skin:     General: Skin is warm and dry.   Neurological:      General: No focal deficit present.      Mental Status: She is alert and oriented to person, place, and time.      Sensory: No sensory deficit.      Motor: No weakness.   Psychiatric:         Mood and Affect: Mood normal.         Behavior: Behavior normal.         Vital signs and nursing notes reviewed.          LAB RESULTS  Recent Results (from the past 24 hour(s))   ECG 12 Lead ED Triage Standing Order; Chest Pain    Collection Time: 09/30/24  2:55 PM   Result Value Ref Range    QT Interval 361 ms    QTC Interval 401 ms   Comprehensive Metabolic Panel    Collection Time: 09/30/24  3:03 PM    Specimen: Blood   Result Value Ref Range    Glucose 105 (H) 65 - 99 mg/dL    BUN 16 8 - 23 mg/dL    Creatinine 1.21 (H) 0.57 - 1.00 mg/dL    Sodium 141 136 - 145 mmol/L    Potassium 4.6 3.5 - 5.2 mmol/L    Chloride 106 98 - 107 mmol/L    CO2 24.9 22.0 - 29.0 mmol/L    Calcium 9.4 8.6 - 10.5 mg/dL    Total Protein 7.0 6.0 - 8.5 g/dL    Albumin 4.3 3.5 - 5.2 g/dL    ALT (SGPT) 11 1 - 33 U/L    AST (SGOT) 16 1 - 32 U/L    Alkaline Phosphatase 51 39 - 117 U/L    Total Bilirubin 0.3 0.0 - 1.2 mg/dL    Globulin 2.7 gm/dL    A/G Ratio 1.6 g/dL    BUN/Creatinine Ratio 13.2 7.0 - 25.0    Anion Gap 10.1 5.0 - 15.0 mmol/L    eGFR 44.6 (L) >60.0 mL/min/1.73   High Sensitivity Troponin T    Collection Time: 09/30/24  3:03 PM    Specimen: Blood   Result Value Ref Range    HS Troponin T 9 <14 ng/L   Green Top (Gel)    Collection Time: 09/30/24  3:03 PM   Result Value Ref Range     Extra Tube Hold for add-ons.    Lavender Top    Collection Time: 09/30/24  3:03 PM   Result Value Ref Range    Extra Tube hold for add-on    Gold Top - SST    Collection Time: 09/30/24  3:03 PM   Result Value Ref Range    Extra Tube Hold for add-ons.    Light Blue Top    Collection Time: 09/30/24  3:03 PM   Result Value Ref Range    Extra Tube Hold for add-ons.    CBC Auto Differential    Collection Time: 09/30/24  3:03 PM    Specimen: Blood   Result Value Ref Range    WBC 6.75 3.40 - 10.80 10*3/mm3    RBC 4.07 3.77 - 5.28 10*6/mm3    Hemoglobin 13.0 12.0 - 15.9 g/dL    Hematocrit 41.3 34.0 - 46.6 %    .5 (H) 79.0 - 97.0 fL    MCH 31.9 26.6 - 33.0 pg    MCHC 31.5 31.5 - 35.7 g/dL    RDW 13.1 12.3 - 15.4 %    RDW-SD 49.4 37.0 - 54.0 fl    MPV 9.5 6.0 - 12.0 fL    Platelets 233 140 - 450 10*3/mm3    Neutrophil % 73.2 42.7 - 76.0 %    Lymphocyte % 17.3 (L) 19.6 - 45.3 %    Monocyte % 7.9 5.0 - 12.0 %    Eosinophil % 0.7 0.3 - 6.2 %    Basophil % 0.6 0.0 - 1.5 %    Immature Grans % 0.3 0.0 - 0.5 %    Neutrophils, Absolute 4.94 1.70 - 7.00 10*3/mm3    Lymphocytes, Absolute 1.17 0.70 - 3.10 10*3/mm3    Monocytes, Absolute 0.53 0.10 - 0.90 10*3/mm3    Eosinophils, Absolute 0.05 0.00 - 0.40 10*3/mm3    Basophils, Absolute 0.04 0.00 - 0.20 10*3/mm3    Immature Grans, Absolute 0.02 0.00 - 0.05 10*3/mm3    nRBC 0.0 0.0 - 0.2 /100 WBC   High Sensitivity Troponin T 2Hr    Collection Time: 09/30/24  4:51 PM    Specimen: Blood   Result Value Ref Range    HS Troponin T 10 <14 ng/L    Troponin T Delta 1 >=-4 - <+4 ng/L       Ordered the above labs and reviewed the results.        RADIOLOGY  XR Chest 1 View    Result Date: 9/30/2024  XR CHEST 1 VW-  HISTORY: Female who is 84 years-old, chest pain  TECHNIQUE: Frontal view of the chest  COMPARISON: 7/11/2023  FINDINGS: The heart is enlarged. Pulmonary vasculature is unremarkable. No focal pulmonary consolidation, pleural effusion, or pneumothorax. Right hemidiaphragm is mildly  elevated. No acute osseous process.      No focal pulmonary consolidation. Cardiomegaly. Follow-up as clinical indications persist.  This report was finalized on 9/30/2024 3:24 PM by Dr. Markel Valentin M.D on Workstation: [id number]       Ordered the above noted radiological studies. Reviewed by me in PACS.            PROCEDURES  Procedures    EKG          EKG time: 1455  Rhythm/Rate: NSR, 74  P waves and OR: nml  QRS, axis: nml, nml   ST and T waves: nml     Interpreted Contemporaneously by me, independently viewed  unchanged compared to prior 7/11/23      HEART SCORE    History Highly suspicious (2)  ECG Normal (0)  Age > or = 65 (2)  Risk factors 1 or 2 (1)  Troponin < or = Normal limit (0)    This patient's HEART score is 5    HEART Score Key:  Scores 0-3: 0.9-1.7% risk of adverse cardiac event. In the HEART Score study, these patients were discharged (0.99% in the retrospective study, 1.7% in the prospective study)  Scores 4-6: 12-16.6% risk of adverse cardiac event. In the HEART Score study, these patients were admitted to the hospital. (11.6% retrospective, 16.6% prospective)  Scores ?7: 50-65% risk of adverse cardiac event. In the HEART Score study, these patients were candidates for early invasive measures. (65.2% retrospective, 50.1% prospective)          MEDICATIONS GIVEN IN ER  Medications   sodium chloride 0.9 % flush 10 mL (has no administration in time range)   aspirin tablet 325 mg (325 mg Oral Given 9/30/24 1539)                   MEDICAL DECISION MAKING, PROGRESS, and CONSULTS    All labs have been independently reviewed by me.  All radiology studies have been reviewed by me and I have also reviewed the radiology report.   EKG's independently viewed and interpreted by me.  Discussion below represents my analysis of pertinent findings related to patient's condition, differential diagnosis, treatment plan and final disposition.      Additional sources:  - Discussed/ obtained information from  independent historians: History obtained from the patient as well as the patient's family at bedside.    - External (non-ED) record review: Upon medical records review, the patient was last seen and evaluated in the outpatient setting with orthopedics on 7/13/2023 secondary to her chronic osteoarthritis where she underwent a total hip arthroplasty.    - Chronic or social conditions impacting care: Family history of CAD, chronic hypertension    - Shared decision making: Admission decision based on shared conversations have between myself, the patient and family at bedside, as well as Mateo Rehman PA-C.      Orders placed during this visit:  Orders Placed This Encounter   Procedures    XR Chest 1 View    [place] Draw    Comprehensive Metabolic Panel    High Sensitivity Troponin T    CBC Auto Differential    High Sensitivity Troponin T 2Hr    NPO Diet NPO Type: Strict NPO    Undress & Gown    Continuous Pulse Oximetry    Oxygen Therapy- Nasal Cannula; Titrate 1-6 LPM Per SpO2; 90 - 95%    ECG 12 Lead ED Triage Standing Order; Chest Pain    ECG 12 Lead ED Triage Standing Order; Chest Pain    Insert Peripheral IV    CBC & Differential    Green Top (Gel)    Lavender Top    Gold Top - SST    Light Blue Top             Differential diagnosis includes but is not limited to:    Acute coronary syndrome, pneumonia, pneumothorax, pulmonary embolism, pleurisy, GERD/gastritis, or peptic ulcer disease      Independent interpretation of labs, radiology studies, and discussions with consultants:    Chest x-ray independently interpreted by myself with my interpretation showing cardiomegaly without area of edema, infiltrate, or pneumothorax.        ED Course as of 09/30/24 1742   Mon Sep 30, 2024   1731 On reevaluation, the patient is resting comfortably and without any further complaints of chest discomfort.  She does have several risk factors for coronary artery disease given her age, family history, as well as hypertension.  I do  think those things combined with her description of the discomfort, would like to admit her today to the observation unit for further evaluation and cardiology assessment.  She is in full agreement with that plan and all questions answered. [BM]   1741 The patient's presentation, workup, as well as diagnosis and treatment plan was discussed at length with Mateo Rehman PA-C.  He agrees to admit the patient to the observation unit today with Dr. Chandler. [BM]      ED Course User Index  [BM] Durga Sarabia MD           DIAGNOSIS  Final diagnoses:   Chest pain, unspecified type   Nausea   Hypertension, unspecified type         DISPOSITION  ADMISSION    Discussed treatment plan and reason for admission with pt/family and admitting physician.  Pt/family voiced understanding of the plan for admission for further testing/treatment as needed.               Latest Documented Vital Signs:  As of 17:42 EDT  BP- 155/79 HR- 68 Temp- 97.5 °F (36.4 °C) (Tympanic) O2 sat- 93%              --    Please note that portions of this were completed with a voice recognition program.       Note Disclaimer: At [facility], we believe that sharing information builds trust and better relationships. You are receiving this note because you are receiving care at [facility] or recently visited. It is possible you will see health information before a provider has talked with you about it. This kind of information can be easy to misunderstand. To help you fully understand what it means for your health, we urge you to discuss this note with your provider.             Durga Sarabia MD  09/30/24 [id number]

## 2024-10-01 ENCOUNTER — APPOINTMENT (OUTPATIENT)
Dept: CARDIOLOGY | Facility: HOSPITAL | Age: 84
DRG: 287 | End: 2024-10-01
Payer: MEDICARE

## 2024-10-01 LAB
ANION GAP SERPL CALCULATED.3IONS-SCNC: 8.6 MMOL/L (ref 5–15)
AORTIC ARCH: 3.1 CM
ASCENDING AORTA: 3.2 CM
BH CV ECHO LEFT VENTRICLE GLOBAL LONGITUDINAL STRAIN: -22.8 %
BH CV ECHO MEAS - ACS: 1.13 CM
BH CV ECHO MEAS - AI P1/2T: 567.7 MSEC
BH CV ECHO MEAS - AO MAX PG: 34 MMHG
BH CV ECHO MEAS - AO MEAN PG: 14.2 MMHG
BH CV ECHO MEAS - AO ROOT DIAM: 3.1 CM
BH CV ECHO MEAS - AO V2 MAX: 291.7 CM/SEC
BH CV ECHO MEAS - AO V2 VTI: 51.3 CM
BH CV ECHO MEAS - AVA(I,D): 1.79 CM2
BH CV ECHO MEAS - EDV(CUBED): 68.4 ML
BH CV ECHO MEAS - EDV(MOD-SP2): 55 ML
BH CV ECHO MEAS - EDV(MOD-SP4): 59 ML
BH CV ECHO MEAS - EF(MOD-BP): 62.4 %
BH CV ECHO MEAS - EF(MOD-SP2): 61.8 %
BH CV ECHO MEAS - EF(MOD-SP4): 62.7 %
BH CV ECHO MEAS - ESV(CUBED): 19.8 ML
BH CV ECHO MEAS - ESV(MOD-SP2): 21 ML
BH CV ECHO MEAS - ESV(MOD-SP4): 22 ML
BH CV ECHO MEAS - FS: 33.8 %
BH CV ECHO MEAS - IVS/LVPW: 1.02 CM
BH CV ECHO MEAS - IVSD: 0.96 CM
BH CV ECHO MEAS - LAT PEAK E' VEL: 6.8 CM/SEC
BH CV ECHO MEAS - LV DIASTOLIC VOL/BSA (35-75): 35.8 CM2
BH CV ECHO MEAS - LV MASS(C)D: 123.4 GRAMS
BH CV ECHO MEAS - LV MAX PG: 4.1 MMHG
BH CV ECHO MEAS - LV MEAN PG: 2.11 MMHG
BH CV ECHO MEAS - LV SYSTOLIC VOL/BSA (12-30): 13.4 CM2
BH CV ECHO MEAS - LV V1 MAX: 101.7 CM/SEC
BH CV ECHO MEAS - LV V1 VTI: 22.7 CM
BH CV ECHO MEAS - LVIDD: 4.1 CM
BH CV ECHO MEAS - LVIDS: 2.7 CM
BH CV ECHO MEAS - LVOT AREA: 4 CM2
BH CV ECHO MEAS - LVOT DIAM: 2.27 CM
BH CV ECHO MEAS - LVPWD: 0.95 CM
BH CV ECHO MEAS - MED PEAK E' VEL: 5.3 CM/SEC
BH CV ECHO MEAS - MV A DUR: 0.12 SEC
BH CV ECHO MEAS - MV A MAX VEL: 68.1 CM/SEC
BH CV ECHO MEAS - MV DEC SLOPE: 379.7 CM/SEC2
BH CV ECHO MEAS - MV DEC TIME: 0.22 SEC
BH CV ECHO MEAS - MV E MAX VEL: 48.7 CM/SEC
BH CV ECHO MEAS - MV E/A: 0.71
BH CV ECHO MEAS - MV MAX PG: 3.4 MMHG
BH CV ECHO MEAS - MV MEAN PG: 1.09 MMHG
BH CV ECHO MEAS - MV P1/2T: 48.1 MSEC
BH CV ECHO MEAS - MV V2 VTI: 18.1 CM
BH CV ECHO MEAS - MVA(P1/2T): 4.6 CM2
BH CV ECHO MEAS - MVA(VTI): 5.1 CM2
BH CV ECHO MEAS - PA ACC TIME: 0.11 SEC
BH CV ECHO MEAS - PA V2 MAX: 116.9 CM/SEC
BH CV ECHO MEAS - RAP SYSTOLE: 3 MMHG
BH CV ECHO MEAS - RV MAX PG: 2.47 MMHG
BH CV ECHO MEAS - RV V1 MAX: 78.6 CM/SEC
BH CV ECHO MEAS - RV V1 VTI: 14.6 CM
BH CV ECHO MEAS - RVSP: 20 MMHG
BH CV ECHO MEAS - SV(LVOT): 91.8 ML
BH CV ECHO MEAS - SV(MOD-SP2): 34 ML
BH CV ECHO MEAS - SV(MOD-SP4): 37 ML
BH CV ECHO MEAS - SVI(LVOT): 55.7 ML/M2
BH CV ECHO MEAS - SVI(MOD-SP2): 20.6 ML/M2
BH CV ECHO MEAS - SVI(MOD-SP4): 22.5 ML/M2
BH CV ECHO MEAS - TR MAX PG: 17.6 MMHG
BH CV ECHO MEAS - TR MAX VEL: 209.6 CM/SEC
BH CV ECHO MEASUREMENTS AVERAGE E/E' RATIO: 8.05
BH CV XLRA - RV BASE: 2.6 CM
BH CV XLRA - RV LENGTH: 6 CM
BH CV XLRA - RV MID: 2.42 CM
BUN SERPL-MCNC: 16 MG/DL (ref 8–23)
BUN/CREAT SERPL: 18 (ref 7–25)
CALCIUM SPEC-SCNC: 9.2 MG/DL (ref 8.6–10.5)
CHLORIDE SERPL-SCNC: 108 MMOL/L (ref 98–107)
CO2 SERPL-SCNC: 25.4 MMOL/L (ref 22–29)
CREAT SERPL-MCNC: 0.89 MG/DL (ref 0.57–1)
DEPRECATED RDW RBC AUTO: 49.6 FL (ref 37–54)
EGFRCR SERPLBLD CKD-EPI 2021: 64.4 ML/MIN/1.73
ERYTHROCYTE [DISTWIDTH] IN BLOOD BY AUTOMATED COUNT: 13.1 % (ref 12.3–15.4)
GLUCOSE SERPL-MCNC: 93 MG/DL (ref 65–99)
HCT VFR BLD AUTO: 36.7 % (ref 34–46.6)
HGB BLD-MCNC: 12.1 G/DL (ref 12–15.9)
LEFT ATRIUM VOLUME INDEX: 26.5 ML/M2
MCH RBC QN AUTO: 33.3 PG (ref 26.6–33)
MCHC RBC AUTO-ENTMCNC: 33 G/DL (ref 31.5–35.7)
MCV RBC AUTO: 101.1 FL (ref 79–97)
PLATELET # BLD AUTO: 209 10*3/MM3 (ref 140–450)
PMV BLD AUTO: 9.6 FL (ref 6–12)
POTASSIUM SERPL-SCNC: 4.3 MMOL/L (ref 3.5–5.2)
QT INTERVAL: 361 MS
QTC INTERVAL: 401 MS
RBC # BLD AUTO: 3.63 10*6/MM3 (ref 3.77–5.28)
SINUS: 2.8 CM
SODIUM SERPL-SCNC: 142 MMOL/L (ref 136–145)
STJ: 2.7 CM
TROPONIN T SERPL HS-MCNC: 13 NG/L
WBC NRBC COR # BLD AUTO: 5.6 10*3/MM3 (ref 3.4–10.8)

## 2024-10-01 PROCEDURE — 93306 TTE W/DOPPLER COMPLETE: CPT | Performed by: STUDENT IN AN ORGANIZED HEALTH CARE EDUCATION/TRAINING PROGRAM

## 2024-10-01 PROCEDURE — 93306 TTE W/DOPPLER COMPLETE: CPT

## 2024-10-01 PROCEDURE — 80048 BASIC METABOLIC PNL TOTAL CA: CPT

## 2024-10-01 PROCEDURE — G0378 HOSPITAL OBSERVATION PER HR: HCPCS

## 2024-10-01 PROCEDURE — 84484 ASSAY OF TROPONIN QUANT: CPT | Performed by: PHYSICIAN ASSISTANT

## 2024-10-01 PROCEDURE — 93356 MYOCRD STRAIN IMG SPCKL TRCK: CPT | Performed by: STUDENT IN AN ORGANIZED HEALTH CARE EDUCATION/TRAINING PROGRAM

## 2024-10-01 PROCEDURE — 99204 OFFICE O/P NEW MOD 45 MIN: CPT | Performed by: INTERNAL MEDICINE

## 2024-10-01 PROCEDURE — 93356 MYOCRD STRAIN IMG SPCKL TRCK: CPT

## 2024-10-01 PROCEDURE — 85027 COMPLETE CBC AUTOMATED: CPT

## 2024-10-01 RX ADMIN — Medication 10 ML: at 20:06

## 2024-10-01 RX ADMIN — Medication 10 ML: at 09:10

## 2024-10-01 RX ADMIN — PANTOPRAZOLE SODIUM 40 MG: 40 TABLET, DELAYED RELEASE ORAL at 09:09

## 2024-10-01 RX ADMIN — ROSUVASTATIN CALCIUM 10 MG: 20 TABLET, FILM COATED ORAL at 20:04

## 2024-10-01 RX ADMIN — ACETAMINOPHEN 500 MG: 500 TABLET ORAL at 20:04

## 2024-10-01 RX ADMIN — Medication 1000 UNITS: at 09:09

## 2024-10-01 RX ADMIN — SERTRALINE 50 MG: 50 TABLET, FILM COATED ORAL at 20:04

## 2024-10-01 RX ADMIN — ACETAMINOPHEN 500 MG: 500 TABLET ORAL at 09:09

## 2024-10-01 NOTE — CONSULTS
Date of Consultation: 10/01/24    Referral Provider: Durga Sarabia MD     Reason for Consultation: Chest pain.    Encounter Provider: Brad Spicer MD    Group of Service: [facility]     Patient Name: Caryl Rae    :1940    Chief complaint: Chest pain.     History of Present Illness:      This is a very pleasant 84 year-old female with a past medical history significant for hyperlipidemia, GERD, and right sided breast cancer status post chemotherapy and radiation in .  She is also a retired nurse anesthetist.    The patient presented on 2024 after an episode of chest discomfort.  She is caretaker for her , who has dementia.  She states that she was about to fixing breakfast when she began to have significant pressure and heaviness substernally that radiated into her left arm and into her left jaw.  She felt nauseated and was mildly diaphoretic as well.  She states that the pain lasted for approximately 1 hour.  Since arrival, she has not had further symptoms.  Her troponin has been negative.  Her EKG showed no ischemic changes.    She does have a murmur, but denied any shortness of breath with exertion recently.  An echocardiogram showed mild aortic stenosis with at least moderate aortic insufficiency (possibly moderate to severe).  Her ejection fraction was normal.  Left ventricular size was also normal.      ECHO 10/1/2024    Left ventricular systolic function is normal. Calculated left ventricular EF = 62.4%    Left ventricular diastolic function is consistent with (grade I) impaired relaxation.    The aortic valve is heavily calcified with restricted left and non-coronary cusps.    Moderate to severe aortic valve regurgitation is present. Vena contracta of 0.65cm.    Mild aortic valve stenosis is present. Peak velocity of the flow distal to the aortic valve is 291.7 cm/s. Aortic valve mean pressure gradient is 14 mmHg.    Estimated right ventricular  systolic pressure from tricuspid regurgitation is normal (<35 mmHg).    There is a trivial pericardial effusion. There is no evidence of cardiac tamponade.       Past Medical History:   Diagnosis Date    Arthritis     GERD (gastroesophageal reflux disease)     History of cancer chemotherapy 2003    History of radiation therapy 2003    History of right breast cancer 2003    Hyperlipidemia     Lung nodule     MD following    Urinary incontinence     wears pads         Past Surgical History:   Procedure Laterality Date    BREAST LUMPECTOMY Right 2003    cancer    COLONOSCOPY      ENDOSCOPY      HIP ARTHROPLASTY Right     HYSTERECTOMY      KNEE ARTHROPLASTY Bilateral     LUMBAR DISC SURGERY      TOTAL HIP ARTHROPLASTY Left 7/13/2023    Procedure: TOTAL HIP ARTHROPLASTY ANTERIOR WITH HANA TABLE;  Surgeon: Bunny Tony II, MD;  Location: [facility];  Service: Orthopedics;  Laterality: Left;         No Known Allergies      No current facility-administered medications on file prior to encounter.     Current Outpatient Medications on File Prior to Encounter   Medication Sig Dispense Refill    acetaminophen (TYLENOL) 500 MG tablet Take 1 tablet by mouth Every 6 (Six) Hours As Needed for Mild Pain.      Melatonin 10 MG tablet Take 1 tablet by mouth Every Night.      omeprazole (priLOSEC) 40 MG capsule Take 1 capsule by mouth Every Morning.      rosuvastatin (CRESTOR) 10 MG tablet Take 1 tablet by mouth Every Morning.      sertraline (ZOLOFT) 50 MG tablet Take 1 tablet by mouth Every Night.      Vitamin D, Cholecalciferol, 25 MCG (1000 UT) capsule Take 1 capsule by mouth Every Morning.      Chlorhexidine Gluconate 2 % pads Apply 1 each topically. USE AS DIRECTED PREOP      cyclobenzaprine (FLEXERIL) 10 MG tablet Take 1 tablet by mouth 3 (Three) Times a Day. 20 tablet 0    HYDROcodone-acetaminophen (NORCO) 5-325 MG per tablet Take 1 tablet by mouth Every 4 (Four) Hours As Needed for Severe Pain. 50 tablet 0     "ibandronate (BONIVA) 150 MG tablet Take 1 tablet by mouth Every 30 (Thirty) Days.      Multiple Vitamins-Minerals (Ocuvite Adult 50+) capsule Take 1 capsule by mouth Every Morning.           Social History     Socioeconomic History    Marital status:    Tobacco Use    Smoking status: Never    Smokeless tobacco: Never   Vaping Use    Vaping status: Never Used   Substance and Sexual Activity    Alcohol use: Never    Drug use: Never    Sexual activity: Defer         Family History   Problem Relation Age of Onset    Malig Hyperthermia Neg Hx        REVIEW OF SYSTEMS:   Pertinent positives are noted in the HPI above.  Otherwise, all the systems were reviewed, and are negative.     Objective:     Vitals:    10/01/24 0745 10/01/24 0759 10/01/24 1108 10/01/24 1512   BP: 149/67 (!) 183/67 143/75 124/55   BP Location:  Right arm Right arm Right arm   Patient Position:  Lying Lying Lying   Pulse:   81 75   Resp:  16 16 16   Temp:  98 °F (36.7 °C) 98 °F (36.7 °C) 98 °F (36.7 °C)   TempSrc:  Oral Oral Oral   SpO2:  98% 97% 96%   Weight: 65.8 kg (145 lb)      Height: 154.9 cm (61\")        Body mass index is 27.4 kg/m².  Flowsheet Rows      Flowsheet Row First Filed Value   Admission Height 154.9 cm (61\") Documented at 09/30/2024 1900   Admission Weight 65.8 kg (145 lb) Documented at 09/30/2024 1900             General:    No acute distress, alert and oriented x4, pleasant                   Head:    Normocephalic, atraumatic.   Eyes:          Conjunctivae and sclerae normal, no icterus.   Throat:   No oral lesions, no thrush, oral mucosa moist.    Neck:   Supple, trachea midline.   Lungs:     Clear to auscultation bilaterally     Heart:    Regular rhythm and normal rate. III/VI SM RUSB and LUSB.   Abdomen:     Soft, non-tender, non-distended, positive bowel sounds.    Extremities:   No clubbing, cyanosis, or edema.     Pulses:   Pulses palpable and equal bilaterally.    Skin:   No bleeding or rash.   Neuro:   Non-focal.  " Moves all extremities well.    Psychiatric:   Normal mood and affect.         Lab Review:                Results from last 7 days   Lab Units 10/01/24  0329   SODIUM mmol/L 142   POTASSIUM mmol/L 4.3   CHLORIDE mmol/L 108*   CO2 mmol/L 25.4   BUN mg/dL 16   CREATININE mg/dL 0.89   GLUCOSE mg/dL 93   CALCIUM mg/dL 9.2     Results from last 7 days   Lab Units 10/01/24  0329 09/30/24  1651 09/30/24  1503   HSTROP T ng/L 13 10 9     Results from last 7 days   Lab Units 10/01/24  0329   WBC 10*3/mm3 5.60   HEMOGLOBIN g/dL 12.1   HEMATOCRIT % 36.7   PLATELETS 10*3/mm3 209                       EKG (reviewed by me personally): Normal sinus rhythm, LVH.      Assessment:   1.  Chest pain with radiation into the left arm and left jaw  2.  Mild aortic stenosis and at least moderate aortic insufficiency (possibly moderate to severe)  3.  History of right-sided breast cancer, status postchemotherapy and radiation in 2003  4.  Gastroesophageal reflux disease  5.  Hyperlipidemia  6.  Osteoarthritis, significant    Plan:       Again, the high-sensitivity troponin has been negative thus far, which is reassuring.  She also has no ischemic changes on her EKG.  She does have significant GERD, although this did not feel like GERD that she has experienced in the past.    She does need an ischemic evaluation at this time.  I have recommended a Lexiscan Myoview stress test tomorrow morning.  She is 84 years of age, and would be unable to exercise adequately on a treadmill.  She also has advanced osteoarthritis.    I did go over her echocardiogram in detail with her.  I reviewed the images.  The aortic insufficiency is at least moderate, and possibly moderate to severe.  However, her left ventricular function is normal and her left ventricular size is normal.  She is also having no shortness of breath with exertion or CHF symptoms.  I do not feel that the aortic valve issues are related to her current symptoms.  She will need follow-up for  this as an outpatient.  I am going to set her up with Dr. Herman in our office after discharge.    Further plans pending the results of the stress test tomorrow morning.    Thank you very much for this consult.    Anthony Spicer MD

## 2024-10-01 NOTE — PROGRESS NOTES
MD ATTESTATION NOTE    The AMEENA and I have discussed this patient's history, physical exam, and treatment plan.  I have reviewed the documentation and personally had a face to face interaction with the patient. I affirm the documentation and agree with the treatment and plan.  The attached note describes my personal findings.      I provided a substantive portion of the care of the patient.  I personally performed the physical exam in its entirety, and below are my findings.        Brief HPI: This patient is a 84-year-old female with a history of hypertension and hyperlipidemia who was admitted to the observation unit for further workup of chest discomfort.  She reported that the chest pain occurred yesterday morning with radiation to her left arm, lasted approximately 1 hour, then subsequently resolved.  She did have associated diaphoresis and nausea at the time that is also since resolved.  Following admission, her workup has remained negative and she has remained pain-free.      PHYSICAL EXAM  ED Triage Vitals   Temp Heart Rate Resp BP SpO2   09/30/24 1453 09/30/24 1453 09/30/24 1453 09/30/24 1458 09/30/24 1453   97.5 °F (36.4 °C) 88 16 153/71 96 %      Temp src Heart Rate Source Patient Position BP Location FiO2 (%)   09/30/24 1453 09/30/24 1453 09/30/24 1853 09/30/24 1853 --   Tympanic Monitor Lying Right arm          GENERAL: Resting comfortably and in no acute distress, nontoxic in appearance  HENT: nares patent  EYES: no scleral icterus  CV: regular rhythm, normal rate, slight systolic ejection murmur heard, no rub  RESPIRATORY: normal effort, lungs clear bilaterally  ABDOMEN: soft, nontender, no rebound or guarding  MUSCULOSKELETAL: no deformity, no edema  NEURO: alert, moves all extremities, follows commands  PSYCH:  calm, cooperative  SKIN: warm, dry    Vital signs and nursing notes reviewed.        Plan: The EKG is nonischemic and serial cardiac enzymes thus far negative.  We will obtain an echocardiogram  and ask cardiology to see in morning consultation.  Further planning to follow.

## 2024-10-01 NOTE — PROGRESS NOTES
Discharge Planning Assessment  [facility]     Patient Name: Caryl Rae  MRN: [id number]  Today's Date: 10/1/2024    Admit Date: 9/30/2024    Plan: Home   Discharge Needs Assessment       Row Name 10/01/24 1332       Living Environment    People in Home spouse    Current Living Arrangements home    Potentially Unsafe Housing Conditions none    Primary Care Provided by self    Provides Primary Care For spouse    Family Caregiver if Needed child(bernadette), adult    Quality of Family Relationships supportive;involved;helpful    Able to Return to Prior Arrangements yes       Resource/Environmental Concerns    Resource/Environmental Concerns none       Transition Planning    Patient/Family Anticipates Transition to home with family    Patient/Family Anticipated Services at Transition none    Transportation Anticipated family or friend will provide       Discharge Needs Assessment    Equipment Currently Used at Home none                   Discharge Plan       Row Name 10/01/24 3250       Plan    Plan Home    Plan Comments Spoke with pt to screen  for DCP/needs.  Pt did confirm facesheet information as correct including her PCP as Dr. Chavez.  Pt reports that she is totally independent with all her self care and mobility.  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  Pt stated that she did recently hire caregivers through [employer] 3 times a week to help her with he spouse.  Pt stated that 3 of their 7 children  assist her alot with her sposue's care needs.  Pt did state that family is with her sposue whle she is at the hospital.  Pt reported that family can transport her home at [place].  Pt denies having any issues getting or affording her home meds.  Pt also stated she has never used HH or SNF in the past.  CCP will follow to assist if any needs do arise.                  Continued Care and Services - Admitted Since 9/30/2024    No active coordination exists  for this encounter.       Expected Discharge Date and Time       Expected Discharge Date Expected Discharge Time    Oct 1, 2024            Demographic Summary       Row Name 10/01/24 1332       General Information    Admission Type observation    Arrived From home    Referral Source admission list    Reason for Consult discharge planning    Preferred Language English                   Functional Status       Row Name 10/01/24 1332       Functional Status    Usual Activity Tolerance good    Current Activity Tolerance good       Functional Status, IADL    Medications independent    Meal Preparation independent    Housekeeping independent    Laundry independent    Shopping independent       Mental Status    General Appearance WDL WDL       Mental Status Summary    Recent Changes in Mental Status/Cognitive Functioning no changes                   Psychosocial    No documentation.                  Abuse/Neglect    No documentation.                  Legal    No documentation.                  Substance Abuse    No documentation.                  Patient Forms    No documentation.                     KT Corrales

## 2024-10-01 NOTE — PROGRESS NOTES
JOSE LUIS NANCE Attestation Note    I supervised care provided by the midlevel provider.    The AMEENA and I have discussed this patient's history, physical exam, and treatment plan. I have reviewed the documentation and personally had a face to face interaction with the patient  I affirm the documentation and agree with the treatment and plan. I provided a substantive portion of the care of this patient.  I personally performed the physical exam, in its entirety.  My personal findings are documented in below:    History:  Patient with history of hyperlipidemia and arthritis is admitted to observation unit for further workup and management of chest pain which began this morning at home.  She describes it as a sharp pain in the anterior lower chest that radiated up towards her left neck and jaw, causing some nausea and mild diaphoresis as well.  The episode lasted approximately 1 hour.  She did not have any dyspnea.  She went to her PCP for a previously scheduled appointment and reported the symptoms while they are in the clinic.  She was subsequently advised to come to the emergency room for further workup.  She has no prior history of CAD.    Physical Exam:  General: No acute distress.  HENT: NCAT, PERRL, Nares patent.  Eyes: no scleral icterus.  Neck: trachea midline, no ROM limitations.  CV: Pink warm and well-perfused throughout  Respiratory: No distress or increased work of breathing  Abdomen: soft, no focal tenderness or rigidity  Musculoskeletal: no deformity.  Neuro: alert, moves all extremities, follows commands.  Skin: warm, dry.    Assessment and Plan:  Chest pain: Cardiology consulted.  Echocardiogram.  Continuous cardiac monitoring.  Trend troponins.    Dyslipidemia: Continue rosuvastatin    GERD: PPI

## 2024-10-01 NOTE — PROGRESS NOTES
ED OBSERVATION PROGRESS/DISCHARGE SUMMARY    Date of Admission: 9/30/2024   LOS: 0 days   PCP: Maida Chavez MD    Final Diagnosis Chest pain      Subjective     Hospital Outcome:     Caryl Rae is a 84 y.o. female with history significant for GERD hyperlipidemia, complaining of sudden chest pain that radiated to her left arm and jaw.  Chest pain was not associated with exertion.  She does report nausea and diaphoresis.  Initial workup includes troponin 9 --> 10, nonischemic EKG in sinus rhythm, creatinine 1.2, and chest x-ray without acute findings.    10/01/2024: Vital signs remain stable. Echocardiogram result reported normal systolic function with an EF 62.4%. Grade 1 diastolic relaxation.The aortic valve is heavily calcified with restricted left and non-coronary cusps. Moderate to severe aortic valve regurgitation is present. Mild aortic valve stenosis is present. Cardiology saw patient in consultation and is planning for stress test in the morning. NPO after midnight.       Review of Systems:   Constitutional:  No weight changes, fever, or chills. No night sweats, no fatigue, no malaise.    ENT/Mouth:  No hearing changes, no ear pain, no nasal congestion, no sinus pain, no hoarseness, no sore throat, no rhinorrhea, no dysphagia.   Eyes:  No eye pain, swelling, or redness. No foreign body, discharge. No vision changes.    Cardiovascular:  No chest pain, no shortness of air, no dyspnea on exertion, no orthopnea, no palpitations, no edema.      Respiratory:  No cough, no smoke exposure, no dyspnea.    Gastrointestinal:  No nausea, vomiting, or diarrhea. No constipation, or GI discomfort. No reflux pain, no anorexia, no dysphagia. No hematochezia or melena.    Genitourinary:  No dyspareunia, no dysuria, no urinary frequency. No hematuria, no urinary incontinence. No flank pain or urinary flow changes.   Musculoskeletal:  No arthralgias, no myalgias, no joint swelling or stiffness. No back or neck pain, no  recent injuries.    Skin:  No skin lesions, no pruritus, no hair changes.    Neuro:  No weakness, no numbness, no paresthesias, no loss of consciousness, no syncope, no dizziness, no headache.   Psych:  No anxiety/panic, no depression, no insomnia, no personality changes, no delusions.    Heme/Lymph:  No bruising, or bleeding. No transfusions history, no lymphadenopathy.   Endocrine:  No polyuria, polydipsia, no temperature intolerances.     Objective   Physical Exam:   Constitutional: Awake, alert. Well developed for age. Nontoxic appearing.   Eyes: PERRL x2, sclerae anicteric, no conjunctival injection. No EOM abnormlaities noted.   HENT: NCAT, mucous membranes moist,   Neck: Supple, no thyromegaly, no lymphadenopathy, trachea midline  Respiratory: Clear to auscultation bilaterally, nonlabored respirations   Cardiovascular: RRR, murmur auscultated, rubs, or gallops, palpable pedal pulses bilaterally. No appreciable edema.   Gastrointestinal: Soft, nontender, not distended.   Musculoskeletal: No bilateral ankle edema, no clubbing or cyanosis to extremities. No obvious deformities.   Psychiatric: Appropriate affect, cooperative. Converses appropriately for age.   Neurologic: Oriented x 3, strength symmetric in all extremities. Cranial nerves grossly intact to confrontation, speech clear  Skin: No rashes, skin intact.     Results Review:    I have reviewed the labs, radiology results and diagnostic studies.    Results from last 7 days   Lab Units 09/30/24  1503   WBC 10*3/mm3 6.75   HEMOGLOBIN g/dL 13.0   HEMATOCRIT % 41.3   PLATELETS 10*3/mm3 233     Results from last 7 days   Lab Units 09/30/24  1503   SODIUM mmol/L 141   POTASSIUM mmol/L 4.6   CHLORIDE mmol/L 106   CO2 mmol/L 24.9   BUN mg/dL 16   CREATININE mg/dL 1.21*   CALCIUM mg/dL 9.4   BILIRUBIN mg/dL 0.3   ALK PHOS U/L 51   ALT (SGPT) U/L 11   AST (SGOT) U/L 16   GLUCOSE mg/dL 105*     Imaging Results (Last 24 Hours)       Procedure Component Value Units  Date/Time    XR Chest 1 View [[id number]] Collected: 09/30/24 1524     Updated: 09/30/24 1527    Narrative:      XR CHEST 1 VW-     HISTORY: Female who is 84 years-old, chest pain     TECHNIQUE: Frontal view of the chest     COMPARISON: 7/11/2023     FINDINGS: The heart is enlarged. Pulmonary vasculature is unremarkable.  No focal pulmonary consolidation, pleural effusion, or pneumothorax.  Right hemidiaphragm is mildly elevated. No acute osseous process.       Impression:      No focal pulmonary consolidation. Cardiomegaly. Follow-up as  clinical indications persist.     This report was finalized on 9/30/2024 3:24 PM by Dr. Markel Valentin M.D on Workstation: [id number]               I have reviewed the medications.  ---------------------------------------------------------------------------------------------  Assessment & Plan   Assessment/Problem List    Chest pain      Plan:    Chest pain  -Troponins flat, EKG nonischemic  -Interval resolution of chest pain symptoms  -TTE reported moderate to severe aortic valve regurgitation is present. Mild aortic valve stenosis is present.  -Continuous cardiac monitoring  -Cardiology saw patient in consultation  -Nitro morphine as needed for pain  -NPO after midnight  -Plan for Stress Test in am     Dyslipidemia  -Continue with rosuvastatin     GERD  -Continue with PPI     Depression  -Continue with paroxetine    Disposition: Pending stress test in am    Follow-up after Discharge: Follow up as directed by cardiology    This note will serve as a progress note    Carol Arevalo, APRN 10/01/24 02:56 EDT    I have worn appropriate PPE during this patient encounter, sanitized my hands both with entering and exiting patient's room.      43 minutes has been spent by [facility] providers in the care of this patient while under observation status

## 2024-10-01 NOTE — PLAN OF CARE
Goal Outcome Evaluation:         Pt admitted after experiecning an episode of angina accompained with diaphoresis, upper extremity weakness and tingling as well as nausea. No complaints throughout the night. Up ad miesha, independent, RA, NSR, VSS. Cardiology consult in the morning.

## 2024-10-01 NOTE — PLAN OF CARE
Goal Outcome Evaluation:              Outcome Evaluation: Pt to be NPO at midnight, and not coffe after 6:00 pm.    Pt to have a stress test tomorrow and cardiology consulted. VSS. pt does not have any other questions at this time.

## 2024-10-02 ENCOUNTER — APPOINTMENT (OUTPATIENT)
Dept: NUCLEAR MEDICINE | Facility: HOSPITAL | Age: 84
DRG: 287 | End: 2024-10-02
Payer: MEDICARE

## 2024-10-02 LAB
ANION GAP SERPL CALCULATED.3IONS-SCNC: 9 MMOL/L (ref 5–15)
BH CV REST NUCLEAR ISOTOPE DOSE: 11.4 MCI
BH CV STRESS COMMENTS STAGE 1: NORMAL
BH CV STRESS DOSE REGADENOSON STAGE 1: 0.4
BH CV STRESS DURATION MIN STAGE 1: 0
BH CV STRESS DURATION SEC STAGE 1: 10
BH CV STRESS NUCLEAR ISOTOPE DOSE: 34.4 MCI
BH CV STRESS PROTOCOL 1: NORMAL
BH CV STRESS RECOVERY BP: NORMAL MMHG
BH CV STRESS RECOVERY HR: 80 BPM
BH CV STRESS STAGE 1: 1
BUN SERPL-MCNC: 15 MG/DL (ref 8–23)
BUN/CREAT SERPL: 15.3 (ref 7–25)
CALCIUM SPEC-SCNC: 9.1 MG/DL (ref 8.6–10.5)
CHLORIDE SERPL-SCNC: 108 MMOL/L (ref 98–107)
CO2 SERPL-SCNC: 24 MMOL/L (ref 22–29)
CREAT SERPL-MCNC: 0.98 MG/DL (ref 0.57–1)
DEPRECATED RDW RBC AUTO: 47.2 FL (ref 37–54)
EGFRCR SERPLBLD CKD-EPI 2021: 57.4 ML/MIN/1.73
ERYTHROCYTE [DISTWIDTH] IN BLOOD BY AUTOMATED COUNT: 12.9 % (ref 12.3–15.4)
GLUCOSE SERPL-MCNC: 93 MG/DL (ref 65–99)
HCT VFR BLD AUTO: 37.7 % (ref 34–46.6)
HGB BLD-MCNC: 12.3 G/DL (ref 12–15.9)
LV EF NUC BP: 79 %
MAXIMAL PREDICTED HEART RATE: 137 BPM
MCH RBC QN AUTO: 32.4 PG (ref 26.6–33)
MCHC RBC AUTO-ENTMCNC: 32.6 G/DL (ref 31.5–35.7)
MCV RBC AUTO: 99.2 FL (ref 79–97)
PERCENT MAX PREDICTED HR: 62.77 %
PLATELET # BLD AUTO: 222 10*3/MM3 (ref 140–450)
PMV BLD AUTO: 9.1 FL (ref 6–12)
POTASSIUM SERPL-SCNC: 4.1 MMOL/L (ref 3.5–5.2)
QT INTERVAL: 390 MS
QTC INTERVAL: 412 MS
RBC # BLD AUTO: 3.8 10*6/MM3 (ref 3.77–5.28)
SODIUM SERPL-SCNC: 141 MMOL/L (ref 136–145)
STRESS BASELINE BP: NORMAL MMHG
STRESS BASELINE HR: 69 BPM
STRESS PERCENT HR: 74 %
STRESS POST PEAK BP: NORMAL MMHG
STRESS POST PEAK HR: 86 BPM
STRESS TARGET HR: 116 BPM
TROPONIN T SERPL HS-MCNC: 12 NG/L
WBC NRBC COR # BLD AUTO: 5.25 10*3/MM3 (ref 3.4–10.8)

## 2024-10-02 PROCEDURE — 84484 ASSAY OF TROPONIN QUANT: CPT

## 2024-10-02 PROCEDURE — 93010 ELECTROCARDIOGRAM REPORT: CPT | Performed by: INTERNAL MEDICINE

## 2024-10-02 PROCEDURE — 25010000002 LIDOCAINE 2% SOLUTION: Performed by: INTERNAL MEDICINE

## 2024-10-02 PROCEDURE — C1894 INTRO/SHEATH, NON-LASER: HCPCS | Performed by: INTERNAL MEDICINE

## 2024-10-02 PROCEDURE — 4A023N7 MEASUREMENT OF CARDIAC SAMPLING AND PRESSURE, LEFT HEART, PERCUTANEOUS APPROACH: ICD-10-PCS | Performed by: INTERNAL MEDICINE

## 2024-10-02 PROCEDURE — 93017 CV STRESS TEST TRACING ONLY: CPT

## 2024-10-02 PROCEDURE — 93018 CV STRESS TEST I&R ONLY: CPT | Performed by: INTERNAL MEDICINE

## 2024-10-02 PROCEDURE — 78452 HT MUSCLE IMAGE SPECT MULT: CPT | Performed by: INTERNAL MEDICINE

## 2024-10-02 PROCEDURE — C1769 GUIDE WIRE: HCPCS | Performed by: INTERNAL MEDICINE

## 2024-10-02 PROCEDURE — 78452 HT MUSCLE IMAGE SPECT MULT: CPT

## 2024-10-02 PROCEDURE — 93458 L HRT ARTERY/VENTRICLE ANGIO: CPT | Performed by: INTERNAL MEDICINE

## 2024-10-02 PROCEDURE — 85027 COMPLETE CBC AUTOMATED: CPT

## 2024-10-02 PROCEDURE — 85347 COAGULATION TIME ACTIVATED: CPT

## 2024-10-02 PROCEDURE — 93005 ELECTROCARDIOGRAM TRACING: CPT

## 2024-10-02 PROCEDURE — B2111ZZ FLUOROSCOPY OF MULTIPLE CORONARY ARTERIES USING LOW OSMOLAR CONTRAST: ICD-10-PCS | Performed by: INTERNAL MEDICINE

## 2024-10-02 PROCEDURE — 0 TECHNETIUM TETROFOSMIN KIT: Performed by: STUDENT IN AN ORGANIZED HEALTH CARE EDUCATION/TRAINING PROGRAM

## 2024-10-02 PROCEDURE — 25010000002 REGADENOSON 0.4 MG/5ML SOLUTION: Performed by: STUDENT IN AN ORGANIZED HEALTH CARE EDUCATION/TRAINING PROGRAM

## 2024-10-02 PROCEDURE — 25010000002 FENTANYL CITRATE (PF) 50 MCG/ML SOLUTION: Performed by: INTERNAL MEDICINE

## 2024-10-02 PROCEDURE — 25510000001 IOPAMIDOL PER 1 ML: Performed by: INTERNAL MEDICINE

## 2024-10-02 PROCEDURE — 93799 UNLISTED CV SVC/PROCEDURE: CPT | Performed by: INTERNAL MEDICINE

## 2024-10-02 PROCEDURE — 25010000002 MIDAZOLAM PER 1 MG: Performed by: INTERNAL MEDICINE

## 2024-10-02 PROCEDURE — 25010000002 HEPARIN (PORCINE) PER 1000 UNITS: Performed by: INTERNAL MEDICINE

## 2024-10-02 PROCEDURE — 93016 CV STRESS TEST SUPVJ ONLY: CPT | Performed by: INTERNAL MEDICINE

## 2024-10-02 PROCEDURE — 93571 IV DOP VEL&/PRESS C FLO 1ST: CPT | Performed by: INTERNAL MEDICINE

## 2024-10-02 PROCEDURE — A9502 TC99M TETROFOSMIN: HCPCS | Performed by: STUDENT IN AN ORGANIZED HEALTH CARE EDUCATION/TRAINING PROGRAM

## 2024-10-02 PROCEDURE — C1887 CATHETER, GUIDING: HCPCS | Performed by: INTERNAL MEDICINE

## 2024-10-02 PROCEDURE — 99214 OFFICE O/P EST MOD 30 MIN: CPT | Performed by: INTERNAL MEDICINE

## 2024-10-02 PROCEDURE — 80048 BASIC METABOLIC PNL TOTAL CA: CPT

## 2024-10-02 RX ORDER — FENTANYL CITRATE 50 UG/ML
INJECTION, SOLUTION INTRAMUSCULAR; INTRAVENOUS
Status: DISCONTINUED | OUTPATIENT
Start: 2024-10-02 | End: 2024-10-02 | Stop reason: HOSPADM

## 2024-10-02 RX ORDER — VERAPAMIL HYDROCHLORIDE 2.5 MG/ML
INJECTION, SOLUTION INTRAVENOUS
Status: DISCONTINUED | OUTPATIENT
Start: 2024-10-02 | End: 2024-10-02 | Stop reason: HOSPADM

## 2024-10-02 RX ORDER — LIDOCAINE HYDROCHLORIDE 20 MG/ML
INJECTION, SOLUTION INFILTRATION; PERINEURAL
Status: DISCONTINUED | OUTPATIENT
Start: 2024-10-02 | End: 2024-10-02 | Stop reason: HOSPADM

## 2024-10-02 RX ORDER — IOPAMIDOL 755 MG/ML
INJECTION, SOLUTION INTRAVASCULAR
Status: DISCONTINUED | OUTPATIENT
Start: 2024-10-02 | End: 2024-10-02 | Stop reason: HOSPADM

## 2024-10-02 RX ORDER — MORPHINE SULFATE 2 MG/ML
1 INJECTION, SOLUTION INTRAMUSCULAR; INTRAVENOUS EVERY 4 HOURS PRN
Status: DISCONTINUED | OUTPATIENT
Start: 2024-10-02 | End: 2024-10-03 | Stop reason: HOSPADM

## 2024-10-02 RX ORDER — REGADENOSON 0.08 MG/ML
0.4 INJECTION, SOLUTION INTRAVENOUS
Status: COMPLETED | OUTPATIENT
Start: 2024-10-02 | End: 2024-10-02

## 2024-10-02 RX ORDER — HYDROCODONE BITARTRATE AND ACETAMINOPHEN 5; 325 MG/1; MG/1
1 TABLET ORAL EVERY 4 HOURS PRN
Status: DISCONTINUED | OUTPATIENT
Start: 2024-10-02 | End: 2024-10-03 | Stop reason: HOSPADM

## 2024-10-02 RX ORDER — SODIUM CHLORIDE 9 MG/ML
50 INJECTION, SOLUTION INTRAVENOUS CONTINUOUS
Status: ACTIVE | OUTPATIENT
Start: 2024-10-02 | End: 2024-10-02

## 2024-10-02 RX ORDER — MIDAZOLAM HYDROCHLORIDE 1 MG/ML
INJECTION INTRAMUSCULAR; INTRAVENOUS
Status: DISCONTINUED | OUTPATIENT
Start: 2024-10-02 | End: 2024-10-02 | Stop reason: HOSPADM

## 2024-10-02 RX ORDER — NALOXONE HCL 0.4 MG/ML
0.4 VIAL (ML) INJECTION
Status: DISCONTINUED | OUTPATIENT
Start: 2024-10-02 | End: 2024-10-03 | Stop reason: HOSPADM

## 2024-10-02 RX ORDER — ASPIRIN 81 MG/1
81 TABLET ORAL DAILY
Status: DISCONTINUED | OUTPATIENT
Start: 2024-10-02 | End: 2024-10-03 | Stop reason: HOSPADM

## 2024-10-02 RX ORDER — SODIUM CHLORIDE 9 MG/ML
100 INJECTION, SOLUTION INTRAVENOUS CONTINUOUS
Status: DISCONTINUED | OUTPATIENT
Start: 2024-10-02 | End: 2024-10-03 | Stop reason: HOSPADM

## 2024-10-02 RX ORDER — ONDANSETRON 2 MG/ML
4 INJECTION INTRAMUSCULAR; INTRAVENOUS EVERY 6 HOURS PRN
Status: DISCONTINUED | OUTPATIENT
Start: 2024-10-02 | End: 2024-10-03 | Stop reason: HOSPADM

## 2024-10-02 RX ORDER — HEPARIN SODIUM 1000 [USP'U]/ML
INJECTION, SOLUTION INTRAVENOUS; SUBCUTANEOUS
Status: DISCONTINUED | OUTPATIENT
Start: 2024-10-02 | End: 2024-10-02 | Stop reason: HOSPADM

## 2024-10-02 RX ORDER — AMLODIPINE BESYLATE 5 MG/1
5 TABLET ORAL
Status: DISCONTINUED | OUTPATIENT
Start: 2024-10-02 | End: 2024-10-03 | Stop reason: HOSPADM

## 2024-10-02 RX ORDER — ACETAMINOPHEN 325 MG/1
650 TABLET ORAL EVERY 4 HOURS PRN
Status: DISCONTINUED | OUTPATIENT
Start: 2024-10-02 | End: 2024-10-03 | Stop reason: HOSPADM

## 2024-10-02 RX ORDER — ONDANSETRON 4 MG/1
4 TABLET, ORALLY DISINTEGRATING ORAL EVERY 6 HOURS PRN
Status: DISCONTINUED | OUTPATIENT
Start: 2024-10-02 | End: 2024-10-03 | Stop reason: HOSPADM

## 2024-10-02 RX ADMIN — PANTOPRAZOLE SODIUM 40 MG: 40 TABLET, DELAYED RELEASE ORAL at 05:09

## 2024-10-02 RX ADMIN — ROSUVASTATIN CALCIUM 10 MG: 20 TABLET, FILM COATED ORAL at 22:57

## 2024-10-02 RX ADMIN — Medication 1000 UNITS: at 09:50

## 2024-10-02 RX ADMIN — SERTRALINE 50 MG: 50 TABLET, FILM COATED ORAL at 22:57

## 2024-10-02 RX ADMIN — TETROFOSMIN 1 DOSE: 1.38 INJECTION, POWDER, LYOPHILIZED, FOR SOLUTION INTRAVENOUS at 08:21

## 2024-10-02 RX ADMIN — TETROFOSMIN 1 DOSE: 1.38 INJECTION, POWDER, LYOPHILIZED, FOR SOLUTION INTRAVENOUS at 07:01

## 2024-10-02 RX ADMIN — ACETAMINOPHEN 325MG 650 MG: 325 TABLET ORAL at 21:54

## 2024-10-02 RX ADMIN — REGADENOSON 0.4 MG: 0.08 INJECTION, SOLUTION INTRAVENOUS at 08:21

## 2024-10-02 RX ADMIN — Medication 10 ML: at 09:51

## 2024-10-02 RX ADMIN — ASPIRIN 81 MG: 81 TABLET, COATED ORAL at 21:54

## 2024-10-02 NOTE — PROGRESS NOTES
LOS: 0 days   Patient Care Team:  Maida Chavez MD as PCP - General (Internal Medicine)  Neeraj Clemens MD as Consulting Physician (Pulmonary Disease)    Chief Complaint: Follow-up chest pain, aortic stenosis, aortic insufficiency.    Interval History: No further chest pain overnight.  No shortness of breath.  No acute events.  For nuclear stress test today.    Vital Signs:  Temp:  [98 °F (36.7 °C)-98.8 °F (37.1 °C)] 98.8 °F (37.1 °C)  Heart Rate:  [73-81] 73  Resp:  [16] 16  BP: (120-183)/(55-75) 131/72  No intake or output data in the 24 hours ending 10/02/24 0731    Physical Exam:   General Appearance:    No acute distress, alert and oriented x4   Lungs:     Clear to auscultation bilaterally     Heart:    Regular rhythm and normal rate. III/VI SM RUSB and LUSB.    Abdomen:     Soft, nontender, nondistended.    Extremities:   No clubbing, cyanosis, or edema.     Results Review:    Results from last 7 days   Lab Units 10/02/24  0516   SODIUM mmol/L 141   POTASSIUM mmol/L 4.1   CHLORIDE mmol/L 108*   CO2 mmol/L 24.0   BUN mg/dL 15   CREATININE mg/dL 0.98   GLUCOSE mg/dL 93   CALCIUM mg/dL 9.1     Results from last 7 days   Lab Units 10/02/24  0516 10/01/24  0329 09/30/24  1651   HSTROP T ng/L 12 13 10     Results from last 7 days   Lab Units 10/02/24  0516   WBC 10*3/mm3 5.25   HEMOGLOBIN g/dL 12.3   HEMATOCRIT % 37.7   PLATELETS 10*3/mm3 222                       I reviewed the patient's new clinical results.        Assessment:  1.  Chest pain with radiation into the left arm and left jaw  2.  Mild aortic stenosis and at least moderate aortic insufficiency (possibly moderate to severe)  3.  History of right-sided breast cancer, status post chemotherapy and radiation in 2003  4.  Gastroesophageal reflux disease  5.  Hyperlipidemia  6.  Osteoarthritis, significant    Plan:  -Proceed with Lexiscan Myoview stress test this morning.  If positive, I will talk to her about a heart catheterization.    -Again, the  aortic insufficiency is at least moderate and possibly moderate to severe on my viewing.  Her left ventricular function and size are normal.  She is not having CHF symptoms or shortness of breath.  The aortic valve issues are not related to her current symptoms and were found coincidentally.  This will need to be followed as an outpatient.  I plan on setting her up with Dr. Hermna in our office at discharge.    Brad Spicer MD  10/02/24  07:31 EDT

## 2024-10-02 NOTE — PLAN OF CARE
Problem: Adult Inpatient Plan of Care  Goal: Plan of Care Review  Outcome: Progressing  Flowsheets (Taken 10/2/2024 0557)  Plan of Care Reviewed With: patient  Outcome Evaluation: Patient AOx4, up a miesha. NPO since midnight. Denies any chest pain during shift. Plan for stress test in AM  Goal: Patient-Specific Goal (Individualized)  Outcome: Progressing  Goal: Absence of Hospital-Acquired Illness or Injury  Outcome: Progressing  Intervention: Identify and Manage Fall Risk  Recent Flowsheet Documentation  Taken 10/2/2024 0400 by Michelle Cavazos RN  Safety Promotion/Fall Prevention: safety round/check completed  Taken 10/2/2024 0200 by Michelle Cavazos RN  Safety Promotion/Fall Prevention: safety round/check completed  Taken 10/2/2024 0000 by Michelle Cavazos RN  Safety Promotion/Fall Prevention: safety round/check completed  Taken 10/1/2024 2200 by Michelle Cavazos RN  Safety Promotion/Fall Prevention: safety round/check completed  Taken 10/1/2024 2000 by Michelle Cavazos RN  Safety Promotion/Fall Prevention: safety round/check completed  Taken 10/1/2024 1955 by Michelle Cavazos RN  Safety Promotion/Fall Prevention: safety round/check completed  Goal: Optimal Comfort and Wellbeing  Outcome: Progressing  Goal: Readiness for Transition of Care  Outcome: Progressing     Problem: Hypertension Comorbidity  Goal: Blood Pressure in Desired Range  Outcome: Progressing     Problem: Chest Pain  Goal: Resolution of Chest Pain Symptoms  Outcome: Progressing     Problem: Pain Acute  Goal: Acceptable Pain Control and Functional Ability  Outcome: Progressing   Goal Outcome Evaluation:  Plan of Care Reviewed With: patient           Outcome Evaluation: Patient AOx4, up a miesha. NPO since midnight. Denies any chest pain during shift. Plan for stress test in AM

## 2024-10-02 NOTE — PROGRESS NOTES
JOSE LUIS NANCE Attestation Note    I supervised care provided by the midlevel provider.    The AMEENA and I have discussed this patient's history, physical exam, and treatment plan. I have reviewed the documentation and personally had a face to face interaction with the patient  I affirm the documentation and agree with the treatment and plan. I provided a substantive portion of the care of this patient.  I personally performed the physical exam, in its entirety.  My personal findings are documented in below:    History:  84-year-old female admitted to the observation unit for further evaluation and management of chest pain.  Pain radiated to the left arm and jaw.  Initial ER workup notable for nonischemic EKG with troponin 9-10.    Physical Exam:  General: No acute distress.  HENT: NCAT, PERRL, Nares patent.  Eyes: no scleral icterus.  Neck: trachea midline, no ROM limitations.  CV: Pink warm and well-perfused throughout  Respiratory: No distress or increased work of breathing  Abdomen: soft, no focal tenderness or rigidity  Musculoskeletal: no deformity.  Neuro: alert, moves all extremities, follows commands.  Skin: warm, dry.    Assessment and Plan:  84-year-old female admitted to the observation unit for further evaluation management of chest pain  - EKG nonischemic  - Troponin 9, 10  - Cardiology consulted  - Chemical stress test today

## 2024-10-02 NOTE — DISCHARGE SUMMARY
ED OBSERVATION PROGRESS/DISCHARGE SUMMARY    Date of Admission: 9/30/2024   LOS: 0 days   PCP: Maida Chavez MD    Final Diagnosis Chest pain      Subjective     Hospital Outcome:      Caryl Rae is a 84 y.o. female with history significant for GERD hyperlipidemia, complaining of sudden chest pain that radiated to her left arm and jaw.  Chest pain was not associated with exertion.  She does report nausea and diaphoresis.  Initial workup includes troponin 9 --> 10, nonischemic EKG in sinus rhythm, creatinine 1.2, and chest x-ray without acute findings.     10/01/2024: Vital signs remain stable. Echocardiogram result reported normal systolic function with an EF 62.4%. Grade 1 diastolic relaxation.The aortic valve is heavily calcified with restricted left and non-coronary cusps. Moderate to severe aortic valve regurgitation is present. Mild aortic valve stenosis is present.  Cardiology plans stress test.    10/2/2024: VSS, asymptomatic overnight.  No chest pain or SOA. Myoview Stress Test performed. Dr. Spicer scheduled patient for a cardiac catheterization today. Patient was informed and in agreement with plan.        Review of Systems:   Constitutional:  No weight changes, fever, or chills. No night sweats, no fatigue, no malaise.    ENT/Mouth:  No hearing changes, no ear pain, no nasal congestion, no sinus pain, no hoarseness, no sore throat, no rhinorrhea, no dysphagia.   Eyes:  No eye pain, swelling, or redness. No foreign body, discharge. No vision changes.    Cardiovascular:  No chest pain, no shortness of air, no dyspnea on exertion, no orthopnea, no palpitations, no edema.      Respiratory:  No cough, no smoke exposure, no dyspnea.    Gastrointestinal:  No nausea, vomiting, or diarrhea. No constipation, or GI discomfort. No reflux pain, no anorexia, no dysphagia. No hematochezia or melena.    Genitourinary:  No dyspareunia, no dysuria, no urinary frequency. No hematuria, no urinary incontinence.  No flank pain or urinary flow changes.   Musculoskeletal:  No arthralgias, no myalgias, no joint swelling or stiffness. No back or neck pain, no recent injuries.    Skin:  No skin lesions, no pruritus, no hair changes.    Neuro:  No weakness, no numbness, no paresthesias, no loss of consciousness, no syncope, no dizziness, no headache.   Psych:  No anxiety/panic, no depression, no insomnia, no personality changes, no delusions.    Heme/Lymph:  No bruising, or bleeding. No transfusions history, no lymphadenopathy.   Endocrine:  No polyuria, polydipsia, no temperature intolerances.        Objective   Physical Exam:   Constitutional: Awake, alert. Well developed for age. Nontoxic appearing.   Eyes: PERRL x2, sclerae anicteric, no conjunctival injection. No EOM abnormlaities noted.   HENT: NCAT, mucous membranes moist,   Neck: Supple, no thyromegaly, no lymphadenopathy, trachea midline  Respiratory: Clear to auscultation bilaterally, nonlabored respirations   Cardiovascular: RRR, no murmur, rubs, or gallops. No appreciable edema.   Gastrointestinal: Soft, nontender, not distended.   Musculoskeletal: No bilateral ankle edema, no clubbing or cyanosis to extremities. No obvious deformities.   Psychiatric: Appropriate affect, cooperative. Converses appropriately for age.   Neurologic: Oriented x 3, strength symmetric in all extremities. Cranial nerves grossly intact to confrontation, speech clear  Skin: No rashes, skin intact.     Results Review:    I have reviewed the labs, radiology results and diagnostic studies.    Results from last 7 days   Lab Units 10/01/24  0329   WBC 10*3/mm3 5.60   HEMOGLOBIN g/dL 12.1   HEMATOCRIT % 36.7   PLATELETS 10*3/mm3 209     Results from last 7 days   Lab Units 10/01/24  0329 09/30/24  1503   SODIUM mmol/L 142 141   POTASSIUM mmol/L 4.3 4.6   CHLORIDE mmol/L 108* 106   CO2 mmol/L 25.4 24.9   BUN mg/dL 16 16   CREATININE mg/dL 0.89 1.21*   CALCIUM mg/dL 9.2 9.4   BILIRUBIN mg/dL  --  0.3    ALK PHOS U/L  --  51   ALT (SGPT) U/L  --  11   AST (SGOT) U/L  --  16   GLUCOSE mg/dL 93 105*     Imaging Results (Last 24 Hours)       ** No results found for the last 24 hours. **            I have reviewed the medications.  ---------------------------------------------------------------------------------------------  Assessment & Plan   Assessment/Problem List    Chest pain      Plan:    Chest pain  -Troponins flat, EKG nonischemic  -Interval resolution of chest pain symptoms  -TTE reported moderate to severe aortic valve regurgitation is present. Mild aortic valve stenosis is present.  -Continuous cardiac monitoring  -Cardiology saw patient in consultation  -Nitro morphine as needed for pain  -NPO   -Stress Test performed showing ischemia to anterior wall  -Plan for Cardiac catheterization today     Dyslipidemia  -Continue with rosuvastatin     GERD  -Continue with PPI     Depression  -Continue with paroxetine    Disposition: Admit for Cardiac Catheterization    Follow-up after Discharge: as directed by in-patient team    This note will serve as a transfer note    DURGA Coto 10/02/24 04:00 EDT    I have worn appropriate PPE during this patient encounter, sanitized my hands both with entering and exiting patient's room.      35 minutes has been spent by [facility] providers in the care of this patient while under observation status

## 2024-10-03 VITALS
DIASTOLIC BLOOD PRESSURE: 66 MMHG | WEIGHT: 145 LBS | HEART RATE: 70 BPM | SYSTOLIC BLOOD PRESSURE: 142 MMHG | OXYGEN SATURATION: 99 % | TEMPERATURE: 98.2 F | BODY MASS INDEX: 27.38 KG/M2 | HEIGHT: 61 IN | RESPIRATION RATE: 18 BRPM

## 2024-10-03 LAB
ANION GAP SERPL CALCULATED.3IONS-SCNC: 8.3 MMOL/L (ref 5–15)
BUN SERPL-MCNC: 15 MG/DL (ref 8–23)
BUN/CREAT SERPL: 17.6 (ref 7–25)
CALCIUM SPEC-SCNC: 8.4 MG/DL (ref 8.6–10.5)
CHLORIDE SERPL-SCNC: 109 MMOL/L (ref 98–107)
CO2 SERPL-SCNC: 21.7 MMOL/L (ref 22–29)
CREAT SERPL-MCNC: 0.85 MG/DL (ref 0.57–1)
DEPRECATED RDW RBC AUTO: 46.6 FL (ref 37–54)
EGFRCR SERPLBLD CKD-EPI 2021: 68.1 ML/MIN/1.73
ERYTHROCYTE [DISTWIDTH] IN BLOOD BY AUTOMATED COUNT: 13 % (ref 12.3–15.4)
GLUCOSE SERPL-MCNC: 96 MG/DL (ref 65–99)
HCT VFR BLD AUTO: 34.2 % (ref 34–46.6)
HGB BLD-MCNC: 11.5 G/DL (ref 12–15.9)
MCH RBC QN AUTO: 33.1 PG (ref 26.6–33)
MCHC RBC AUTO-ENTMCNC: 33.6 G/DL (ref 31.5–35.7)
MCV RBC AUTO: 98.6 FL (ref 79–97)
PLATELET # BLD AUTO: 188 10*3/MM3 (ref 140–450)
PMV BLD AUTO: 9.5 FL (ref 6–12)
POTASSIUM SERPL-SCNC: 4.2 MMOL/L (ref 3.5–5.2)
RBC # BLD AUTO: 3.47 10*6/MM3 (ref 3.77–5.28)
SODIUM SERPL-SCNC: 139 MMOL/L (ref 136–145)
WBC NRBC COR # BLD AUTO: 5.35 10*3/MM3 (ref 3.4–10.8)

## 2024-10-03 PROCEDURE — 80048 BASIC METABOLIC PNL TOTAL CA: CPT | Performed by: INTERNAL MEDICINE

## 2024-10-03 PROCEDURE — 99239 HOSP IP/OBS DSCHRG MGMT >30: CPT | Performed by: INTERNAL MEDICINE

## 2024-10-03 PROCEDURE — 85027 COMPLETE CBC AUTOMATED: CPT | Performed by: INTERNAL MEDICINE

## 2024-10-03 RX ORDER — ASPIRIN 81 MG/1
81 TABLET ORAL DAILY
Start: 2024-10-04

## 2024-10-03 RX ORDER — AMLODIPINE BESYLATE 5 MG/1
5 TABLET ORAL
Qty: 30 TABLET | Refills: 1 | Status: SHIPPED | OUTPATIENT
Start: 2024-10-04

## 2024-10-03 RX ORDER — NITROGLYCERIN 0.4 MG/1
0.4 TABLET SUBLINGUAL
Qty: 25 TABLET | Refills: 1 | Status: SHIPPED | OUTPATIENT
Start: 2024-10-03

## 2024-10-03 RX ADMIN — Medication 1000 UNITS: at 09:20

## 2024-10-03 RX ADMIN — ASPIRIN 81 MG: 81 TABLET, COATED ORAL at 09:20

## 2024-10-03 RX ADMIN — AMLODIPINE BESYLATE 5 MG: 5 TABLET ORAL at 09:20

## 2024-10-03 RX ADMIN — PANTOPRAZOLE SODIUM 40 MG: 40 TABLET, DELAYED RELEASE ORAL at 06:33

## 2024-10-03 NOTE — DISCHARGE SUMMARY
Date of Admission: 9/30/2024    Date of Discharge:  10/3/2024    Discharge Diagnoses:   1.  Chest pain with radiation into the left arm and left jaw - nonobstructive coronary artery disease by cath on 10/2/2024 (50 to 60% mid LAD with negative FFR, 30% proximal left circumflex, 20 to 30% proximal RCA)  2.  Mild aortic stenosis and at least moderate aortic insufficiency (possibly moderate to severe)  3.  History of right-sided breast cancer, status post chemotherapy and radiation in 2003  4.  Gastroesophageal reflux disease  5.  Hyperlipidemia  6.  Osteoarthritis, significant  7.  Hypertension, new    Procedures Performed:  1.  Echocardiogram on 10/1/2024  2.  Lexiscan Myoview stress test on 10/2/2024  3.  Left heart catheterization on 10/2/2024         Physical Exam:           General Appearance:    No acute distress, alert and oriented x4   Lungs:     Clear to auscultation bilaterally     Heart:    Regular rhythm and normal rate. III/VI SM RUSB and LUSB.    Abdomen:     Soft, nontender, nondistended.    Extremities:   No clubbing, cyanosis, or edema.  Right radial cath access site without hematoma.  Normal pulse in the right radial artery.       Hospital Course:     This is a very pleasant 84 year-old female with a past medical history significant for hyperlipidemia, GERD, and right sided breast cancer status post chemotherapy and radiation in 2003.  She is also a retired nurse anesthetist.     The patient presented to the emergency department on 9/30/2024 after an episode of chest discomfort.  She is caretaker for her , who has dementia.  She states that she was about to fix breakfast for him, when she began to have significant pressure and heaviness substernally that radiated into her left jaw and left arm.  She felt nauseated and mildly diaphoretic as well.  The pain lasted for approximately 1 hour.  She does have significant GERD, although she had not had symptoms like this in the past.  Her EKG did  not show any ischemic changes, and her troponin was negative.    She underwent an echocardiogram which showed mild aortic stenosis and at least moderate aortic insufficiency (possibly moderate to severe).  Her ejection fraction and left ventricular size were both normal.  She had not had any shortness of breath or congestive heart failure symptoms.    She underwent a Lexiscan Myoview stress test on 10/2/2024 which showed possible mid anterior ischemia.  She then underwent a left heart catheterization on 10/2/2024.  This showed a 50 to 60% mid LAD stenosis which was insignificant by FFR criteria.  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  She had 30% proximal disease in her left circumflex.  This will be treated medically.  It was felt that she had noncardiac chest pain based on the results of the heart catheterization.  She had a normal pulse and no hematoma at the right radial access site.  There were no noted complications from the heart catheterization.    Given the nonobstructive coronary artery disease, I recommended aspirin 81 mg/day.  She will continue on Crestor 10 mg/day.  She was hypertensive (fairly significantly at times) while hospitalized.  She did not carry a previous diagnosis of hypertension.  She was started on amlodipine 5 mg/day, and she knows to watch for any peripheral edema with this medication.    She does need follow-up for her aortic valve.  She likely needs an echocardiogram in 6 months to 1 year.  I am going to set her up with my partner, Dr. Herman, in the office in approximately 2 to 3 weeks.    Discussed in detail with the patient and her son at bedside.    Discharge Medications:     Discharge Medications        New Medications        Instructions Start Date   amLODIPine 5 MG tablet  Commonly known as: NORVASC   5 mg, Oral, Every 24 Hours Scheduled   Start Date: October 4, 2024     aspirin 81 MG EC tablet   81 mg, Oral, Daily    Start Date: October 4, 2024     nitroglycerin 0.4 MG SL tablet  Commonly known as: NITROSTAT   0.4 mg, Sublingual, Every 5 Minutes PRN, Take no more than 3 doses in 15 minutes.             Continue These Medications        Instructions Start Date   acetaminophen 500 MG tablet  Commonly known as: TYLENOL   500 mg, Oral, Every 6 Hours PRN      Chlorhexidine Gluconate 2 % pads   1 pad , Apply externally, USE AS DIRECTED PREOP      cyclobenzaprine 10 MG tablet  Commonly known as: FLEXERIL   10 mg, Oral, 3 Times Daily      HYDROcodone-acetaminophen 5-325 MG per tablet  Commonly known as: NORCO   1 tablet, Oral, Every 4 Hours PRN      ibandronate 150 MG tablet  Commonly known as: BONIVA   150 mg, Oral, Every 30 Days      Melatonin 10 MG tablet   10 mg, Oral, Nightly      Ocuvite Adult 50+ capsule   1 capsule, Oral, Every Morning      omeprazole 40 MG capsule  Commonly known as: priLOSEC   40 mg, Oral, Every Morning      rosuvastatin 10 MG tablet  Commonly known as: CRESTOR   10 mg, Oral, Every Morning      sertraline 50 MG tablet  Commonly known as: ZOLOFT   50 mg, Oral, Nightly      Vitamin D (Cholecalciferol) 25 MCG (1000 UT) capsule   1,000 Units, Oral, Every Morning                 Follow-up:  1.  Follow-up with Dr. William Herman in 2 to 3 weeks in our office (this will be arranged).      Time Spent on Discharge: 35 minutes.      Brad Spicer MD  10/03/24  09:30 EDT

## 2024-10-03 NOTE — PLAN OF CARE
"Goal Outcome Evaluation:  Plan of Care Reviewed With: patient        Progress: improving  Outcome Evaluation: Pt R radial site, CDI soft, VSS. Pt c/o arthritic pain which was relieved with Tylenol. Pt refused dose of amlodipine this evening. Pt stated, \"I'll wait to speak to my actual cardiologist before I take it.\"  Possible D/C today home.                               "

## 2024-10-03 NOTE — PLAN OF CARE
Goal Outcome Evaluation:   Vital signs stable.  R radial cath site, TR band deflated, removed; dry dressing placed.  On room air.  Up with only standby assist.  Estimated discharge date: tomorrow.

## 2024-10-03 NOTE — CASE MANAGEMENT/SOCIAL WORK
Case Management Discharge Note      Final Note: home no needs         Selected Continued Care - Discharged on 10/3/2024 Admission date: 9/30/2024 - Discharge disposition: Home or Self Care      Destination    No services have been selected for the patient.                Durable Medical Equipment    No services have been selected for the patient.                Dialysis/Infusion    No services have been selected for the patient.                Home Medical Care    No services have been selected for the patient.                Therapy    No services have been selected for the patient.                Community Resources    No services have been selected for the patient.                Community & DME    No services have been selected for the patient.                    Transportation Services  Private: Car    Final Discharge Disposition Code: 01 - home or self-care

## 2024-10-07 LAB — ACT BLD: 262 SECONDS (ref 82–152)

## 2024-11-27 ENCOUNTER — TRANSCRIBE ORDERS (OUTPATIENT)
Dept: ADMINISTRATIVE | Facility: HOSPITAL | Age: 84
End: 2024-11-27
Payer: MEDICARE

## 2024-11-27 DIAGNOSIS — J84.9 ILD (INTERSTITIAL LUNG DISEASE): Primary | ICD-10-CM

## 2025-02-06 ENCOUNTER — PREP FOR SURGERY (OUTPATIENT)
Dept: SURGERY | Facility: SURGERY CENTER | Age: 85
End: 2025-02-06
Payer: MEDICARE

## 2025-02-06 ENCOUNTER — OFFICE VISIT (OUTPATIENT)
Dept: GASTROENTEROLOGY | Facility: CLINIC | Age: 85
End: 2025-02-06
Payer: MEDICARE

## 2025-02-06 VITALS
BODY MASS INDEX: 28.68 KG/M2 | OXYGEN SATURATION: 97 % | HEART RATE: 98 BPM | SYSTOLIC BLOOD PRESSURE: 140 MMHG | TEMPERATURE: 97.9 F | DIASTOLIC BLOOD PRESSURE: 60 MMHG | WEIGHT: 151.9 LBS | HEIGHT: 61 IN

## 2025-02-06 DIAGNOSIS — R49.0 HOARSENESS: Chronic | ICD-10-CM

## 2025-02-06 DIAGNOSIS — R07.89 ATYPICAL CHEST PAIN: ICD-10-CM

## 2025-02-06 DIAGNOSIS — K21.9 GASTROESOPHAGEAL REFLUX DISEASE, UNSPECIFIED WHETHER ESOPHAGITIS PRESENT: Primary | Chronic | ICD-10-CM

## 2025-02-06 DIAGNOSIS — Z87.19 HISTORY OF ESOPHAGEAL STRICTURE: Chronic | ICD-10-CM

## 2025-02-06 DIAGNOSIS — R49.0 HOARSENESS: ICD-10-CM

## 2025-02-06 DIAGNOSIS — R07.9 CHEST PAIN, UNSPECIFIED TYPE: Primary | ICD-10-CM

## 2025-02-06 DIAGNOSIS — K21.9 GASTROESOPHAGEAL REFLUX DISEASE, UNSPECIFIED WHETHER ESOPHAGITIS PRESENT: ICD-10-CM

## 2025-02-06 PROCEDURE — 1160F RVW MEDS BY RX/DR IN RCRD: CPT | Performed by: NURSE PRACTITIONER

## 2025-02-06 PROCEDURE — 99204 OFFICE O/P NEW MOD 45 MIN: CPT | Performed by: NURSE PRACTITIONER

## 2025-02-06 PROCEDURE — 1159F MED LIST DOCD IN RCRD: CPT | Performed by: NURSE PRACTITIONER

## 2025-02-06 RX ORDER — PAROXETINE 20 MG/1
1 TABLET, FILM COATED ORAL
COMMUNITY

## 2025-02-06 RX ORDER — SODIUM CHLORIDE 0.9 % (FLUSH) 0.9 %
10 SYRINGE (ML) INJECTION AS NEEDED
OUTPATIENT
Start: 2025-02-06

## 2025-02-06 RX ORDER — SODIUM CHLORIDE 0.9 % (FLUSH) 0.9 %
3 SYRINGE (ML) INJECTION EVERY 12 HOURS SCHEDULED
OUTPATIENT
Start: 2025-02-06

## 2025-02-06 NOTE — PATIENT INSTRUCTIONS
Schedule EGD for further evaluation, orders placed.  Additional recommendations will be made based on EGD findings.    Continue Omeprazole 40mg in the morning  Take Famotidine (Pepcid) 20mg as needed in the evening depending on diet    Follow antireflux precautions.  Recommend avoiding eating within 3 to 4 hours of bedtime.  Avoid foods that can trigger symptoms which may include citrus fruits, spicy foods, tomatoes and red sauces, chocolate, coffee/tea, caffeinated or carbonated beverages, alcohol.    Recommend avoiding NSAID medications (Ibuprofen, Advil, Aleve, Motrin, Naproxen, etc).  If needed for pain, it is okay to take Tylenol (acetaminophen) available over-the-counter.  Do not exceed recommended daily dose.

## 2025-02-06 NOTE — PROGRESS NOTES
Chief Complaint   Patient presents with    Heartburn           History of Present Illness  History of Present Illness  The patient presents for evaluation of GERD and chest pain.  She has history of CAD, aortic stenosis, aortic insufficiency, right-sided breast cancer s/p chemotherapy and radiation (2003), GERD, esophageal stricture, macrocytic anemia    In late September 2024 patient developed new onset of chest pain that woke her up from her sleep.  Pain radiated to her back and left arm, and she had associated diaphoresis and nausea.  She went to the ER and had extensive cardiac evaluation and 4-day hospitalization.  Cardiology determined that chest pain was not due to cardiac etiology and they recommended GI evaluation.    She denies any further episodes of intense chest pain since hospitalization.  She has chronic GERD and has been on omeprazole 40 mg daily for at least 3 years.  She had previous EGD about 5 years ago which showed esophageal stricture s/p dilation.  In general heartburn and reflux are managed with current medication regimen.  Reflux symptoms can occur at night if consuming late meals or dessert. Denies dysphagia, weight loss, or change in appetite.  She has chronic hoarseness and has had evaluation with ENT, they suspect hoarseness is due to GERD.  She also has dental erosion secondary to GERD.  She avoids NSAIDs.  She denies any change in bowel habits or blood in stool.    Cardiac workup 10/2024:  S/p heart cath on 10/2/2024 showing nonobstructive CAD (50 to 60% mid LAD with negative FFR, 30% proximal left circumflex, 20 to 30% proximal RCA).  Started on aspirin, statin, and amlodipine.       Result Review :      CBC (No Diff) (10/03/2024 03:43) Hgb 11.5, MCV 98.6    Previous EGD/Colonoscopy ~5 years ago with Dr. Saenz showed esophageal stricture s/p dilation.  No history of polyps and she was told she did not need any further colonoscopies.    Medication list reviewed        Review of  "Systems      Vital Signs:   /60   Pulse 98   Temp 97.9 °F (36.6 °C)   Ht 154.9 cm (61\")   Wt 68.9 kg (151 lb 14.4 oz)   SpO2 97%   BMI 28.70 kg/m²     Body mass index is 28.7 kg/m².     Physical Exam  Constitutional:       Appearance: Normal appearance.   HENT:      Head: Normocephalic and atraumatic.      Nose: Nose normal.   Eyes:      Extraocular Movements: Extraocular movements intact.      Conjunctiva/sclera: Conjunctivae normal.      Pupils: Pupils are equal, round, and reactive to light.   Pulmonary:      Effort: Pulmonary effort is normal.   Musculoskeletal:      Cervical back: Normal range of motion.   Neurological:      General: No focal deficit present.      Mental Status: She is alert and oriented to person, place, and time.   Psychiatric:         Mood and Affect: Mood normal.         Behavior: Behavior normal.         Thought Content: Thought content normal.         Judgment: Judgment normal.             Assessment and Plan    Diagnoses and all orders for this visit:    1. Gastroesophageal reflux disease, unspecified whether esophagitis present (Primary)    2. Hoarseness    3. Atypical chest pain    4. History of esophageal stricture       Assessment & Plan  1. Gastroesophageal Reflux Disease (GERD).  She has chronic GERD.  Reflux/heartburn is typically managed with omeprazole 40 mg daily but she has chronic hoarseness and dental erosion.  Cardiac workup for episode of chest pain last year showed nonobstructive CAD and GI evaluation was recommended.  Will proceed with EGD.  Patient is advised to continue omeprazole, add famotidine (Pepcid) as needed in the evening.       Return in about 4 months (around 6/6/2025).       Patient Instructions   Schedule EGD for further evaluation, orders placed.  Additional recommendations will be made based on EGD findings.    Continue Omeprazole 40mg in the morning  Take Famotidine (Pepcid) 20mg as needed in the evening depending on diet    Follow antireflux " precautions.  Recommend avoiding eating within 3 to 4 hours of bedtime.  Avoid foods that can trigger symptoms which may include citrus fruits, spicy foods, tomatoes and red sauces, chocolate, coffee/tea, caffeinated or carbonated beverages, alcohol.    Recommend avoiding NSAID medications (Ibuprofen, Advil, Aleve, Motrin, Naproxen, etc).  If needed for pain, it is okay to take Tylenol (acetaminophen) available over-the-counter.  Do not exceed recommended daily dose.           Patient or patient representative verbalized consent for the use of Ambient Listening during the visit with  DURGA Hodgson for chart documentation. 2/6/2025  15:26 EST            DURGA Ley  [facility] [place] [address]  Office: [phone]

## 2025-02-26 ENCOUNTER — TELEPHONE (OUTPATIENT)
Dept: FAMILY MEDICINE CLINIC | Facility: CLINIC | Age: 85
End: 2025-02-26
Payer: MEDICARE

## 2025-02-26 NOTE — TELEPHONE ENCOUNTER
Patient accompanied spouse to apt with [facility]. Patient originally requested to see Dr. Hoffman as new patient. Dr. Hoffman advised her to schedule with Paulie as a new patient. Please advise if Paulie  will take on patient? Ty

## 2025-03-25 ENCOUNTER — OFFICE VISIT (OUTPATIENT)
Dept: FAMILY MEDICINE CLINIC | Facility: CLINIC | Age: 85
End: 2025-03-25
Payer: MEDICARE

## 2025-03-25 VITALS
OXYGEN SATURATION: 95 % | RESPIRATION RATE: 14 BRPM | HEIGHT: 61 IN | DIASTOLIC BLOOD PRESSURE: 60 MMHG | BODY MASS INDEX: 28.75 KG/M2 | SYSTOLIC BLOOD PRESSURE: 118 MMHG | TEMPERATURE: 97.5 F | HEART RATE: 89 BPM | WEIGHT: 152.3 LBS

## 2025-03-25 DIAGNOSIS — M85.80 OSTEOPENIA AFTER MENOPAUSE: Chronic | ICD-10-CM

## 2025-03-25 DIAGNOSIS — I10 PRIMARY HYPERTENSION: Chronic | ICD-10-CM

## 2025-03-25 DIAGNOSIS — Z76.89 ENCOUNTER TO ESTABLISH CARE: Primary | ICD-10-CM

## 2025-03-25 DIAGNOSIS — Z78.0 OSTEOPENIA AFTER MENOPAUSE: Chronic | ICD-10-CM

## 2025-03-25 DIAGNOSIS — K21.9 GASTROESOPHAGEAL REFLUX DISEASE, UNSPECIFIED WHETHER ESOPHAGITIS PRESENT: Chronic | ICD-10-CM

## 2025-03-25 DIAGNOSIS — F51.04 PSYCHOPHYSIOLOGICAL INSOMNIA: Chronic | ICD-10-CM

## 2025-03-25 DIAGNOSIS — Z63.6 CAREGIVER STRESS: Chronic | ICD-10-CM

## 2025-03-25 DIAGNOSIS — E78.2 MIXED HYPERLIPIDEMIA: Chronic | ICD-10-CM

## 2025-03-25 DIAGNOSIS — R49.0 HOARSENESS: ICD-10-CM

## 2025-03-25 RX ORDER — TRAZODONE HYDROCHLORIDE 50 MG/1
25-50 TABLET ORAL NIGHTLY
Qty: 90 TABLET | Refills: 1 | Status: SHIPPED | OUTPATIENT
Start: 2025-03-25

## 2025-03-25 SDOH — SOCIAL STABILITY - SOCIAL INSECURITY: DEPENDENT RELATIVE NEEDING CARE AT HOME: Z63.6

## 2025-03-25 NOTE — PROGRESS NOTES
Chief Complaint  Establish Care    Subjective        Caryl Rae presents to [facility]  History of Present Illness    History of Present Illness  The patient is an 84-year-old female who presents to establish care for GERD, depression, sleep disturbance, hypertension, hyperlipidemia, osteopenia, and health maintenance.    She has been experiencing severe GERD symptoms, including hoarseness, which is more pronounced in the mornings or during inclement weather. She consulted an ENT specialist 2 years ago, who attributed her hoarseness to GERD. Despite this, she has not undergone an EGD. The ENT specialist performed a nasal endoscopy to examine her vocal cords. She reports difficulty in telephone communication due to her voice quality but does not consume alcohol or tobacco. She is scheduled for an EGD on Monday. Her current medication regimen includes omeprazole in the morning and Pepcid at night.    She also reports feelings of depression, which she attributes to caregiver stress, coping with her 's health issues. She is currently on Paxil 30 mg for anxiety and depression, which was increased from 20 mg recently. She believes her fatigue is primarily due to poor sleep rather than depression. She was previously on Zoloft but was switched to Paxil due to perceived ineffectiveness.    She is seeking assistance with her sleep disturbances. She reports feeling overwhelmed and anxious, particularly at night. She has gained approximately 10 pounds due to nighttime eating. She reports that her daily functioning is significantly improved when her sleep is managed. She was previously prescribed Ambien 10 mg, which she halved and found effective. However, her previous primary care physician has discontinued this medication. She has been self-medicating with trazodone 50 mg, provided by her son-in-law, which she finds beneficial. She has tried melatonin and Tylenol without  success.    She is on amlodipine for blood pressure management and reports no side effects. She has a follow-up appointment with Dr. Kim in mid-April 2025.    She is on rosuvastatin for cholesterol management and reports no issues.    She had a bone density scan 2 to 3 years ago, which showed osteopenia. She was on ibandronate for 5 years but is not currently on any medication for this condition.    She is up-to-date with her vaccinations, including COVID-19 and influenza in September 2024. She had a wellness visit around this time last year.    Supplemental Information  She had a cardiac workup in October 2024, which included a cardiac catheterization, echocardiogram, stress test, and blood work. The results showed a normal sinus rhythm with occasional skipped beats and mild stenosis of one of her valves. She was advised to take aspirin 81 mg daily. She has a cough and is seeing a pulmonologist for a spot on her lung, which has remained stable for the past 4 years. 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 She reports no hearing issues or trouble swallowing. She goes to the dentist regularly and is going to work on her lower teeth next week. She has arthritic issues that go from her shoulder up the back of her neck and into her head. She was sent to physical therapy for this, but it did not help. She is not doing anything right now. She has had two knee replacements and two hip replacements. She sees a dermatologist once a year and needs to make an appointment with her because she has a white patch on her nose.    SOCIAL HISTORY  She does not drink or smoke.    FAMILY HISTORY  Her mother had a pacemaker for congestive heart failure and lived to be . Her father had rheumatic fever as a child and possibly a murmur but did not have a pacemaker or take blood pressure medication. Her uncle had cancer of the colon, and her paternal grandfather  "had cancer of the esophagus.  KOFFI-7 Score: KOFFI 7 Total Score: 6  PHQ-9 Total Score: 10    MEDICATIONS  Current  IMMUNIZATIONS  She has received multiple COVID-19 vaccines and had the influenza vaccine in September 2024.       Objective   Vital Signs:  /60   Pulse 89   Temp 97.5 °F (36.4 °C) (Infrared)   Resp 14   Ht 154.9 cm (61\")   Wt 69.1 kg (152 lb 4.8 oz)   SpO2 95%   BMI 28.78 kg/m²   Estimated body mass index is 28.78 kg/m² as calculated from the following:    Height as of this encounter: 154.9 cm (61\").    Weight as of this encounter: 69.1 kg (152 lb 4.8 oz).       BMI is >= 25 and <30. (Overweight) The following options were offered after discussion;: Information on healthy weight added to patient's after visit summary.      Physical Exam  Vitals and nursing note reviewed.   Constitutional:       General: She is not in acute distress.     Appearance: She is well-developed. She is not ill-appearing.      Comments: Well-dressed, well-appearing, appears younger than stated age   HENT:      Head: Normocephalic and atraumatic.      Right Ear: Tympanic membrane, ear canal and external ear normal.      Left Ear: Tympanic membrane, ear canal and external ear normal.      Ears:      Comments: Cerumen buildup but no impaction     Mouth/Throat:      Mouth: Mucous membranes are moist.      Pharynx: Uvula midline. No posterior oropharyngeal erythema.   Eyes:      General: No scleral icterus.        Right eye: No discharge.         Left eye: No discharge.      Conjunctiva/sclera: Conjunctivae normal.      Pupils: Pupils are equal, round, and reactive to light.   Neck:      Thyroid: No thyromegaly.   Cardiovascular:      Rate and Rhythm: Normal rate and regular rhythm.      Heart sounds: Normal heart sounds.   Pulmonary:      Effort: Pulmonary effort is normal.      Breath sounds: Normal breath sounds.   Abdominal:      General: Bowel sounds are normal. There is no distension.      Palpations: Abdomen is soft. "      Tenderness: There is no abdominal tenderness.   Musculoskeletal:         General: No deformity.      Cervical back: Neck supple.      Comments: Gait smooth and steady   Lymphadenopathy:      Cervical: No cervical adenopathy.   Skin:     General: Skin is warm and dry.   Neurological:      General: No focal deficit present.      Mental Status: She is alert and oriented to person, place, and time.   Psychiatric:         Mood and Affect: Mood normal.         Behavior: Behavior normal.         Thought Content: Thought content normal.          Physical Exam       Result Review :            Results  Imaging  Spot on lung identified through MRI, likely scar tissue from previous infection or calcification.    Testing  EKG showed normal sinus rhythm with a few occasional skipped beats.                Assessment and Plan     Diagnoses and all orders for this visit:    1. Encounter to establish care (Primary)    2. Psychophysiological insomnia  -     traZODone (DESYREL) 50 MG tablet; Take 0.5-1 tablets by mouth Every Night.  Dispense: 90 tablet; Refill: 1    3. Caregiver stress    4. Primary hypertension    5. Hoarseness    6. Gastroesophageal reflux disease, unspecified whether esophagitis present    7. Mixed hyperlipidemia    8. Osteopenia after menopause        Assessment & Plan  1. Gastroesophageal Reflux Disease (GERD).  She reports severe GERD symptoms and hoarseness in her voice, which worsens in the morning and during bad weather. She is currently taking omeprazole in the morning and Pepcid at night as advised by her previous primary physician. An EGD is scheduled for Monday to further investigate her symptoms.    2.  Caregiver stress  She reports feeling overwhelmed and depressed due to her caregiving responsibilities for her  with dementia. She is currently taking Paxil 30 mg daily, which was recently increased from 20 mg. She was advised to continue this medication and to consult before discontinuing it  due to potential withdrawal symptoms.    3. Sleep Disturbance.  She has been experiencing poor sleep and has been taking trazodone 50 mg, half a tablet provided by her son-in-law, which has improved her sleep quality. A prescription for trazodone 50 mg, with instructions to take half to a whole tablet as needed, will be sent to [facility]. If she experiences morning grogginess, she should adjust the timing of the medication.    4. Hypertension.  Her blood pressure is well-controlled on her current medication regimen of amlodipine. No changes were made to this regimen.    5. Hyperlipidemia.  She is currently taking rosuvastatin without any issues. No changes were made to this regimen.    6. Osteopenia.  She was previously on ibandronate for 5 years but is not currently on any medication for osteopenia. She was advised to continue regular bone density scans, weightbearing exercise and adequate vitamin D and calcium.    7. Health Maintenance.  She is up to date on her vaccines except for tetanus, which she does not recall receiving recently. She was advised to schedule an annual wellness visit in June or July.    PROCEDURE              Follow Up     Return in about 3 months (around 6/25/2025) for Medicare Wellness.  Patient was given instructions and counseling regarding her condition or for health maintenance advice. Please see specific information pulled into the AVS if appropriate.    Patient or patient representative verbalized consent for the use of Ambient Listening during the visit with  DURGA Kline for chart documentation. 4/14/2025  06:49 EDT

## 2025-03-28 ENCOUNTER — PATIENT ROUNDING (BHMG ONLY) (OUTPATIENT)
Dept: FAMILY MEDICINE CLINIC | Facility: CLINIC | Age: 85
End: 2025-03-28
Payer: MEDICARE

## 2025-03-28 NOTE — PROGRESS NOTES
A My-Chart message has been sent to the patient [place] [facility] [place]

## 2025-03-31 ENCOUNTER — ANESTHESIA EVENT (OUTPATIENT)
Dept: SURGERY | Facility: SURGERY CENTER | Age: 85
End: 2025-03-31
Payer: MEDICARE

## 2025-03-31 ENCOUNTER — HOSPITAL ENCOUNTER (OUTPATIENT)
Facility: SURGERY CENTER | Age: 85
Setting detail: HOSPITAL OUTPATIENT SURGERY
Discharge: HOME | End: 2025-03-31
Attending: INTERNAL MEDICINE | Admitting: INTERNAL MEDICINE
Payer: MEDICARE

## 2025-03-31 ENCOUNTER — ANESTHESIA (OUTPATIENT)
Dept: SURGERY | Facility: SURGERY CENTER | Age: 85
End: 2025-03-31
Payer: MEDICARE

## 2025-03-31 VITALS
WEIGHT: 147 LBS | TEMPERATURE: 97.8 F | BODY MASS INDEX: 27.75 KG/M2 | HEIGHT: 61 IN | HEART RATE: 80 BPM | RESPIRATION RATE: 16 BRPM | OXYGEN SATURATION: 98 % | SYSTOLIC BLOOD PRESSURE: 139 MMHG | DIASTOLIC BLOOD PRESSURE: 69 MMHG

## 2025-03-31 DIAGNOSIS — K21.9 GASTROESOPHAGEAL REFLUX DISEASE, UNSPECIFIED WHETHER ESOPHAGITIS PRESENT: ICD-10-CM

## 2025-03-31 DIAGNOSIS — R49.0 HOARSENESS: ICD-10-CM

## 2025-03-31 DIAGNOSIS — R07.9 CHEST PAIN, UNSPECIFIED TYPE: ICD-10-CM

## 2025-03-31 PROCEDURE — 43239 EGD BIOPSY SINGLE/MULTIPLE: CPT | Performed by: INTERNAL MEDICINE

## 2025-03-31 PROCEDURE — 88305 TISSUE EXAM BY PATHOLOGIST: CPT | Performed by: INTERNAL MEDICINE

## 2025-03-31 PROCEDURE — 25810000003 LACTATED RINGERS PER 1000 ML: Performed by: INTERNAL MEDICINE

## 2025-03-31 PROCEDURE — 25010000002 PROPOFOL 200 MG/20ML EMULSION: Performed by: ANESTHESIOLOGY

## 2025-03-31 PROCEDURE — 25010000002 PROPOFOL 1000 MG/100ML EMULSION: Performed by: ANESTHESIOLOGY

## 2025-03-31 PROCEDURE — 25810000003 LACTATED RINGERS PER 1000 ML: Performed by: ANESTHESIOLOGY

## 2025-03-31 PROCEDURE — 25010000002 GLYCOPYRROLATE 0.2 MG/ML SOLUTION: Performed by: ANESTHESIOLOGY

## 2025-03-31 PROCEDURE — 25010000002 LIDOCAINE 2% SOLUTION: Performed by: ANESTHESIOLOGY

## 2025-03-31 RX ORDER — PROPOFOL 10 MG/ML
INJECTION, EMULSION INTRAVENOUS AS NEEDED
Status: DISCONTINUED | OUTPATIENT
Start: 2025-03-31 | End: 2025-03-31 | Stop reason: SURG

## 2025-03-31 RX ORDER — SODIUM CHLORIDE 0.9 % (FLUSH) 0.9 %
3 SYRINGE (ML) INJECTION EVERY 12 HOURS SCHEDULED
Status: DISCONTINUED | OUTPATIENT
Start: 2025-03-31 | End: 2025-03-31 | Stop reason: HOSPADM

## 2025-03-31 RX ORDER — SODIUM CHLORIDE, SODIUM LACTATE, POTASSIUM CHLORIDE, CALCIUM CHLORIDE 600; 310; 30; 20 MG/100ML; MG/100ML; MG/100ML; MG/100ML
INJECTION, SOLUTION INTRAVENOUS CONTINUOUS PRN
Status: DISCONTINUED | OUTPATIENT
Start: 2025-03-31 | End: 2025-03-31 | Stop reason: SURG

## 2025-03-31 RX ORDER — LIDOCAINE HYDROCHLORIDE 20 MG/ML
INJECTION, SOLUTION INFILTRATION; PERINEURAL AS NEEDED
Status: DISCONTINUED | OUTPATIENT
Start: 2025-03-31 | End: 2025-03-31 | Stop reason: SURG

## 2025-03-31 RX ORDER — LIDOCAINE HYDROCHLORIDE 10 MG/ML
0.5 INJECTION, SOLUTION INFILTRATION; PERINEURAL ONCE AS NEEDED
Status: DISCONTINUED | OUTPATIENT
Start: 2025-03-31 | End: 2025-03-31 | Stop reason: HOSPADM

## 2025-03-31 RX ORDER — SODIUM CHLORIDE, SODIUM LACTATE, POTASSIUM CHLORIDE, CALCIUM CHLORIDE 600; 310; 30; 20 MG/100ML; MG/100ML; MG/100ML; MG/100ML
50 INJECTION, SOLUTION INTRAVENOUS CONTINUOUS
Status: DISCONTINUED | OUTPATIENT
Start: 2025-03-31 | End: 2025-03-31 | Stop reason: HOSPADM

## 2025-03-31 RX ORDER — GLYCOPYRROLATE 0.2 MG/ML
INJECTION INTRAMUSCULAR; INTRAVENOUS AS NEEDED
Status: DISCONTINUED | OUTPATIENT
Start: 2025-03-31 | End: 2025-03-31 | Stop reason: SURG

## 2025-03-31 RX ORDER — PROPOFOL 10 MG/ML
INJECTION, EMULSION INTRAVENOUS CONTINUOUS PRN
Status: DISCONTINUED | OUTPATIENT
Start: 2025-03-31 | End: 2025-03-31 | Stop reason: SURG

## 2025-03-31 RX ORDER — SODIUM CHLORIDE 0.9 % (FLUSH) 0.9 %
10 SYRINGE (ML) INJECTION AS NEEDED
Status: DISCONTINUED | OUTPATIENT
Start: 2025-03-31 | End: 2025-03-31 | Stop reason: HOSPADM

## 2025-03-31 RX ADMIN — PROPOFOL 50 MG: 10 INJECTION, EMULSION INTRAVENOUS at 12:45

## 2025-03-31 RX ADMIN — SODIUM CHLORIDE, SODIUM LACTATE, POTASSIUM CHLORIDE, CALCIUM CHLORIDE 50 ML/HR: 20; 30; 600; 310 INJECTION, SOLUTION INTRAVENOUS at 12:18

## 2025-03-31 RX ADMIN — LIDOCAINE HYDROCHLORIDE 50 MG: 20 INJECTION, SOLUTION INFILTRATION; PERINEURAL at 12:43

## 2025-03-31 RX ADMIN — GLYCOPYRROLATE 0.1 MG: 0.2 INJECTION, SOLUTION INTRAMUSCULAR; INTRAVENOUS at 12:43

## 2025-03-31 RX ADMIN — SODIUM CHLORIDE, POTASSIUM CHLORIDE, SODIUM LACTATE AND CALCIUM CHLORIDE: 600; 310; 30; 20 INJECTION, SOLUTION INTRAVENOUS at 12:40

## 2025-03-31 RX ADMIN — PROPOFOL 160 MCG/KG/MIN: 10 INJECTION, EMULSION INTRAVENOUS at 12:45

## 2025-03-31 NOTE — ANESTHESIA PREPROCEDURE EVALUATION
Anesthesia Evaluation     NPO Solid Status: > 8 hours  NPO Liquid Status: > 2 hours           Airway   Mallampati: II  TM distance: >3 FB  Neck ROM: full  Dental - normal exam     Pulmonary    Cardiovascular   Exercise tolerance: good (4-7 METS)    Rhythm: regular  Rate: normal    (+) hypertension well controlled less than 2 medications, valvular problems/murmurs AI, CAD (60% LAD recommended medical managment), hyperlipidemia    ROS comment:   ·  Left ventricular systolic function is normal. Calculated left ventricular EF = 62.4%  ·  Left ventricular diastolic function is consistent with (grade I) impaired relaxation.  ·  The aortic valve is heavily calcified with restricted left and non-coronary cusps.  ·  Moderate to severe aortic valve regurgitation is present. Vena contracta of 0.65cm.  ·  Mild aortic valve stenosis is present. Peak velocity of the flow distal to the aortic valve is 291.7 cm/s. Aortic valve mean pressure gradient is 14 mmHg.  ·  Estimated right ventricular systolic pressure from tricuspid regurgitation is normal (<35 mmHg).  ·  There is a trivial pericardial effusion. There is no evidence of cardiac tamponade.         Neuro/Psych  (+) headaches, psychiatric history Depression  GI/Hepatic/Renal/Endo    (+) GERD well controlled    Musculoskeletal     Abdominal    Substance History      OB/GYN          Other   arthritis,   history of cancer remission                      Anesthesia Plan    ASA 3     MAC     intravenous induction     Anesthetic plan, risks, benefits, and alternatives have been provided, discussed and informed consent has been obtained with: patient.        CODE STATUS:

## 2025-03-31 NOTE — H&P
No chief complaint on file.      HPI  GERD  NCCP         Problem List:    Patient Active Problem List   Diagnosis    Hip joint replacement status    Chest pain    Gastroesophageal reflux disease    Hoarseness       Medical History:    Past Medical History:   Diagnosis Date    Arthritis     Cancer     Breast CA  20 yrars sgo    Cataract     Depression     GERD (gastroesophageal reflux disease)     Headache     Heart murmur 10/3/2024    History of cancer chemotherapy 2003    History of radiation therapy 2003    History of right breast cancer 2003    Hyperlipidemia     Hypertension     Lung nodule     MD following    Osteopenia     Urinary incontinence     wears pads        Social History:    Social History     Socioeconomic History    Marital status:    Tobacco Use    Smoking status: Never     Passive exposure: Never    Smokeless tobacco: Never   Vaping Use    Vaping status: Never Used   Substance and Sexual Activity    Alcohol use: Never    Drug use: Never    Sexual activity: Not Currently     Partners: Male     Birth control/protection: Abstinence       Family History:   Family History   Problem Relation Age of Onset    Ulcerative colitis Mother     Arthritis Mother     Hearing loss Mother     Hypertension Mother     Arthritis Father     Hearing loss Father     Colon polyps Daughter     Colon cancer Paternal Uncle     Malig Hyperthermia Neg Hx     Crohn's disease Neg Hx     Irritable bowel syndrome Neg Hx        Surgical History:   Past Surgical History:   Procedure Laterality Date    BREAST LUMPECTOMY Right 2003    cancer    CARDIAC CATHETERIZATION N/A 10/02/2024    Procedure: Left Heart Cath;  Surgeon: Elham Montes MD;  Location:  [facility];  Service: Cardiovascular;  Laterality: N/A;    CARDIAC CATHETERIZATION N/A 10/02/2024    Procedure: Resting Full Cycle Ratio;  Surgeon: Elham Montes MD;  Location:  [facility];  Service: Cardiovascular;  Laterality: N/A;     CARDIAC CATHETERIZATION N/A 10/02/2024    Procedure: Coronary angiography;  Surgeon: Elham Montes MD;  Location:  [facility];  Service: Cardiovascular;  Laterality: N/A;    COLONOSCOPY  2021    ENDOSCOPY  2021    EYE SURGERY      Cataracts    FRACTURE SURGERY      Fractured pelvis    HIP ARTHROPLASTY Right     HYSTERECTOMY      JOINT REPLACEMENT      Four lower joints    KNEE ARTHROPLASTY Bilateral     LUMBAR DISC SURGERY      SPINE SURGERY      TOTAL HIP ARTHROPLASTY Left 07/13/2023    Procedure: TOTAL HIP ARTHROPLASTY ANTERIOR WITH HANA TABLE;  Surgeon: Bunny Tony II, MD;  Location:  [facility];  Service: Orthopedics;  Laterality: Left;       No current facility-administered medications for this encounter.    Current Outpatient Medications:     acetaminophen (TYLENOL) 500 MG tablet, Take 1 tablet by mouth Every 6 (Six) Hours As Needed for Mild Pain., Disp: , Rfl:     amLODIPine (NORVASC) 5 MG tablet, Take 1 tablet by mouth Daily., Disp: 30 tablet, Rfl: 1    aspirin 81 MG EC tablet, Take 1 tablet by mouth Daily., Disp: , Rfl:     Melatonin 10 MG tablet, Take 1 tablet by mouth Every Night., Disp: , Rfl:     Multiple Vitamins-Minerals (Ocuvite Adult 50+) capsule, Take 1 capsule by mouth Every Morning., Disp: , Rfl:     omeprazole (priLOSEC) 40 MG capsule, Take 1 capsule by mouth Every Morning., Disp: , Rfl:     PARoxetine (PAXIL) 20 MG tablet, Take 1.5 tablets by mouth every night at bedtime., Disp: , Rfl:     rosuvastatin (CRESTOR) 10 MG tablet, Take 1 tablet by mouth Every Morning., Disp: , Rfl:     traZODone (DESYREL) 50 MG tablet, Take 0.5-1 tablets by mouth Every Night., Disp: 90 tablet, Rfl: 1    Vitamin D, Cholecalciferol, 25 MCG (1000 UT) capsule, Take 1 capsule by mouth Every Morning., Disp: , Rfl:     Allergies: No Known Allergies     The following portions of the patient's history were reviewed by me and updated as appropriate: review of systems, allergies, current  medications, past family history, past medical history, past social history, past surgical history and problem list.    There were no vitals filed for this visit.    PHYSICAL EXAM:    CONSTITUTIONAL:  today's vital signs reviewed by me  GASTROINTESTINAL: abdomen is soft nontender nondistended with normal active bowel sounds, no masses are appreciated    Assessment/ Plan  GERD  NCCP    egd    Risks and benefits as well as alternatives to endoscopic evaluation were explained to the patient and they voiced understanding and wish to proceed.  These risks include but are not limited to the risk of bleeding, perforation, adverse reaction to sedation, and missed lesions.  The patient was given the opportunity to ask questions prior to the endoscopic procedure.

## 2025-03-31 NOTE — DISCHARGE INSTRUCTIONS
ENDOSCOPY - EGD/COLONOSCOPY       ADULT CARE DISCHARGE  INSTRUCTIONS         Instructions for the next 24 hours after your Procedure:     Adult supervision     Do NOT drink any alcohol      Do not work today     NO important decisions     DO NOT sign any legal documents     You may shower/ bathe       DO NOT  DRIVE or operate machinery     Resume normal activity tomorrow       Discharge  Diet:     Avoid spicy/ greasy foods     Avoid any food that will cause more gas or bloating         Symptoms you may temporarily experience:      Sore Throat     Hoarseness     Bloating/Cramping     Dizziness     IV Irritation/tenderness     Gas or Belching     Slight fever     Small amount of blood in vomit or stool           Call Your Doctor for the following Problems: ______________________     _______________________     Fever of 101 degrees or higher       Sharp abdominal  pain     Red streak up the arm from the IV site     Severe cramping        Large amount of blood in stool or vomit      ***  If polyps were removed there is a risk of bleeding which is  more likely to occur 7-10 days after colonoscopy           *** Seek IMMEDIATE medical attention and call 911 if you develop symptoms such as:     Chest pain     Shortness of breath     Severe bleeding

## 2025-04-01 LAB
CYTO UR: NORMAL
LAB AP CASE REPORT: NORMAL
LAB AP CLINICAL INFORMATION: NORMAL
PATH REPORT.FINAL DX SPEC: NORMAL
PATH REPORT.GROSS SPEC: NORMAL

## 2025-04-01 NOTE — ANESTHESIA POSTPROCEDURE EVALUATION
"Patient: Caryl Rae    Procedure Summary       Date: 03/31/25 Room / Location: [facility] / [facility]    Anesthesia Start: 1240 Anesthesia Stop: 1256    Procedure: ESOPHAGOGASTRODUODENOSCOPY WITH BIOPSY Diagnosis:       Chest pain, unspecified type      Gastroesophageal reflux disease, unspecified whether esophagitis present      Hoarseness      (Chest pain, unspecified type [R07.9])      (Gastroesophageal reflux disease, unspecified whether esophagitis present [K21.9])      (Hoarseness [R49.0])    Surgeons: Roddy Shine MD Provider: Gail Solano MD    Anesthesia Type: MAC ASA Status: 3            Anesthesia Type: MAC    Vitals  Vitals Value Taken Time   /69 03/31/25 13:25   Temp 36.6 °C (97.8 °F) 03/31/25 12:54   Pulse 81 03/31/25 13:23   Resp 16 03/31/25 13:15   SpO2 97 % 03/31/25 13:23   Vitals shown include unfiled device data.        Post Anesthesia Care and Evaluation    Patient location during evaluation: bedside  Patient participation: complete - patient participated  Level of consciousness: awake  Pain management: adequate    Airway patency: patent  Anesthetic complications: No anesthetic complications    Cardiovascular status: acceptable  Respiratory status: acceptable  Hydration status: acceptable    Comments: /69   Pulse 80   Temp 36.6 °C (97.8 °F) (Temporal)   Resp 16   Ht 154.9 cm (61\")   Wt 66.7 kg (147 lb)   SpO2 98%   BMI 27.78 kg/m²     "

## 2025-06-10 ENCOUNTER — OFFICE VISIT (OUTPATIENT)
Dept: GASTROENTEROLOGY | Facility: CLINIC | Age: 85
End: 2025-06-10
Payer: MEDICARE

## 2025-06-10 VITALS
HEIGHT: 61 IN | WEIGHT: 142.9 LBS | BODY MASS INDEX: 26.98 KG/M2 | SYSTOLIC BLOOD PRESSURE: 124 MMHG | TEMPERATURE: 97.7 F | OXYGEN SATURATION: 94 % | DIASTOLIC BLOOD PRESSURE: 58 MMHG | HEART RATE: 84 BPM

## 2025-06-10 DIAGNOSIS — J30.2 SEASONAL ALLERGIES: Chronic | ICD-10-CM

## 2025-06-10 DIAGNOSIS — K21.9 GASTROESOPHAGEAL REFLUX DISEASE WITHOUT ESOPHAGITIS: Primary | Chronic | ICD-10-CM

## 2025-06-10 DIAGNOSIS — K44.9 HIATAL HERNIA: Chronic | ICD-10-CM

## 2025-06-10 PROCEDURE — 99214 OFFICE O/P EST MOD 30 MIN: CPT | Performed by: NURSE PRACTITIONER

## 2025-06-10 PROCEDURE — 1159F MED LIST DOCD IN RCRD: CPT | Performed by: NURSE PRACTITIONER

## 2025-06-10 PROCEDURE — 1160F RVW MEDS BY RX/DR IN RCRD: CPT | Performed by: NURSE PRACTITIONER

## 2025-06-10 NOTE — PATIENT INSTRUCTIONS
Continue Omeprazole 40 mg daily  Take Famotidine 20 mg as needed for breakthrough symptoms or based on diet.     Follow antireflux precautions for management of GERD.  Recommend avoiding eating within 3 to 4 hours of bedtime.  Avoid foods that can trigger symptoms which may include citrus fruits, spicy foods, tomatoes and red sauces, chocolate, coffee/tea, caffeinated or carbonated beverages, alcohol.     Recommend avoiding NSAID medications (Ibuprofen, Advil, Aleve, Motrin, Naproxen, etc).  If needed for pain, it is okay to take Tylenol (acetaminophen) available over-the-counter.  Do not exceed recommended daily dose.

## 2025-06-10 NOTE — PROGRESS NOTES
"Chief Complaint   Patient presents with    Follow-up     GERD, chest pain  S/p EGD           History of Present Illness  History of Present Illness  The patient presents for follow-up of GERD and chest pain.  She has history of CAD, aortic stenosis, aortic insufficiency, right-sided breast cancer s/p chemotherapy and radiation (2003), GERD, esophageal stricture, tortuous esophagus, and macrocytic anemia.    She denies any further episodes of chest pain.  GERD symptoms are managed with omeprazole 40 mg daily.  She takes Pepcid 20 mg at night for spicy food or dining out.  Her only complaint today is of bothersome allergies.  No other GI concerns.  We discussed EGD findings of tortuous esophagus and 2 cm hiatal hernia.     Result Review :      Progress Notes by Kellie Martin APRN (02/06/2025 15:00)     Cardiac workup 10/2024:  S/p heart cath on 10/2/2024 showing nonobstructive CAD (50 to 60% mid LAD with negative FFR, 30% proximal left circumflex, 20 to 30% proximal RCA).  Started on aspirin, statin, and amlodipine.    Upper GI Endoscopy (03/31/2025 12:31) tortuous esophagus, mucus in the esophagus, 2 cm hiatal hernia, gastritis, normal duodenum  Tissue Pathology Exam (03/31/2025 12:49) benign    Previous EGD/Colonoscopy ~2020 with Dr. Saenz showed esophageal stricture s/p dilation.  No history of polyps and she was told she did not need any further colonoscopies.     Medication list reviewed        Review of Systems   Constitutional: Negative.    HENT: Negative.     Eyes: Negative.    Respiratory: Negative.     Cardiovascular: Negative.    Gastrointestinal: Negative.    Endocrine: Negative.    Genitourinary: Negative.    Musculoskeletal: Negative.    Skin: Negative.    Allergic/Immunologic: Negative.    Neurological: Negative.    Hematological: Negative.    Psychiatric/Behavioral: Negative.           Vital Signs:   /58   Pulse 84   Temp 97.7 °F (36.5 °C)   Ht 154.9 cm (61\")   Wt 64.8 kg (142 lb 14.4 oz)  "  SpO2 94%   BMI 27.00 kg/m²     Body mass index is 27 kg/m².     Physical Exam  Constitutional:       Appearance: Normal appearance.   HENT:      Head: Normocephalic and atraumatic.      Nose: Nose normal.   Eyes:      Extraocular Movements: Extraocular movements intact.      Conjunctiva/sclera: Conjunctivae normal.      Pupils: Pupils are equal, round, and reactive to light.   Cardiovascular:      Rate and Rhythm: Normal rate.   Pulmonary:      Effort: Pulmonary effort is normal.   Musculoskeletal:      Cervical back: Normal range of motion.   Neurological:      General: No focal deficit present.      Mental Status: She is alert and oriented to person, place, and time.   Psychiatric:         Mood and Affect: Mood normal.         Behavior: Behavior normal.         Thought Content: Thought content normal.         Judgment: Judgment normal.             Assessment and Plan    Diagnoses and all orders for this visit:    1. Gastroesophageal reflux disease without esophagitis (Primary)    2. Hiatal hernia    3. Seasonal allergies           Discussion:  GERD managed with omeprazole 40 mg daily and Pepcid 20 mg as needed, no further episodes of chest pain.  EGD showed tortuous esophagus and small hiatal hernia.  No new complaints today.  She gets refills from her PCP.    Return if symptoms worsen or fail to improve.       Patient Instructions   Continue Omeprazole 40 mg daily  Take Famotidine 20 mg as needed for breakthrough symptoms or based on diet.     Follow antireflux precautions for management of GERD.  Recommend avoiding eating within 3 to 4 hours of bedtime.  Avoid foods that can trigger symptoms which may include citrus fruits, spicy foods, tomatoes and red sauces, chocolate, coffee/tea, caffeinated or carbonated beverages, alcohol.     Recommend avoiding NSAID medications (Ibuprofen, Advil, Aleve, Motrin, Naproxen, etc).  If needed for pain, it is okay to take Tylenol (acetaminophen) available over-the-counter.   Do not exceed recommended daily dose.          Patient or patient representative verbalized consent for the use of Ambient Listening during the visit with  DURGA Hodgson for chart documentation. 6/10/2025  13:51 EDT            DURGA Ley  [facility] [address]  Office: [phone]

## 2025-06-13 NOTE — TELEPHONE ENCOUNTER
Rx Refill Note  Requested Prescriptions     Pending Prescriptions Disp Refills    omeprazole (priLOSEC) 40 MG capsule 90 capsule 1     Sig: Take 1 capsule by mouth Every Morning.    PARoxetine (PAXIL) 20 MG tablet 135 tablet 1     Sig: Take 1.5 tablets by mouth every night at bedtime.      Last office visit with prescribing clinician: 3/25/2025   Last telemedicine visit with prescribing clinician: Visit date not found   Next office visit with prescribing clinician: 7/7/2025                         Would you like a call back once the refill request has been completed: [] Yes [] No    If the office needs to give you a call back, can they leave a voicemail: [] Yes [] No    Tiffanie Blanton Rep  06/13/25, 12:41 EDT

## 2025-06-13 NOTE — TELEPHONE ENCOUNTER
Caller: Butch Caryl Ann    Relationship: Self    Best call back number: [phone]     Requested Prescriptions:   Requested Prescriptions     Pending Prescriptions Disp Refills    omeprazole (priLOSEC) 40 MG capsule       Sig: Take 1 capsule by mouth Every Morning.    PARoxetine (PAXIL) 20 MG tablet       Sig: Take 1.5 tablets by mouth every night at bedtime.        Pharmacy where request should be sent: [id number] [address] AT [place] [address] - [phone]  - [phone] FX     Last office visit with prescribing clinician: 3/25/2025   Last telemedicine visit with prescribing clinician: Visit date not found   Next office visit with prescribing clinician: 7/7/2025     Additional details provided by patient: PATIENT HAS 1 DAY LEFT OF PAXIL. PATIENT DOES NOT WANT THE PRESCRIPTIONS TO GO TO [facility] PLEASE SENT TO [facility]    Does the patient have less than a 3 day supply:  [x] Yes  [] No    Tiffanie Francois Rep   06/13/25 11:36 EDT

## 2025-06-16 RX ORDER — OMEPRAZOLE 40 MG/1
40 CAPSULE, DELAYED RELEASE ORAL EVERY MORNING
Qty: 90 CAPSULE | Refills: 1 | Status: SHIPPED | OUTPATIENT
Start: 2025-06-16

## 2025-06-16 RX ORDER — PAROXETINE 20 MG/1
30 TABLET, FILM COATED ORAL
Qty: 135 TABLET | Refills: 1 | Status: SHIPPED | OUTPATIENT
Start: 2025-06-16

## 2025-07-07 ENCOUNTER — OFFICE VISIT (OUTPATIENT)
Dept: FAMILY MEDICINE CLINIC | Facility: CLINIC | Age: 85
End: 2025-07-07
Payer: MEDICARE

## 2025-07-07 VITALS
WEIGHT: 140.4 LBS | TEMPERATURE: 95.5 F | BODY MASS INDEX: 26.53 KG/M2 | DIASTOLIC BLOOD PRESSURE: 58 MMHG | HEART RATE: 84 BPM | OXYGEN SATURATION: 96 % | SYSTOLIC BLOOD PRESSURE: 118 MMHG

## 2025-07-07 DIAGNOSIS — R49.0 HOARSENESS: ICD-10-CM

## 2025-07-07 DIAGNOSIS — K21.9 GASTROESOPHAGEAL REFLUX DISEASE, UNSPECIFIED WHETHER ESOPHAGITIS PRESENT: ICD-10-CM

## 2025-07-07 DIAGNOSIS — E78.2 MIXED HYPERLIPIDEMIA: ICD-10-CM

## 2025-07-07 DIAGNOSIS — R93.89 ABNORMAL FINDINGS ON DIAGNOSTIC IMAGING OF OTHER SPECIFIED BODY STRUCTURES: ICD-10-CM

## 2025-07-07 DIAGNOSIS — D64.9 ANEMIA, UNSPECIFIED TYPE: ICD-10-CM

## 2025-07-07 DIAGNOSIS — R42 DIZZINESS: ICD-10-CM

## 2025-07-07 DIAGNOSIS — I10 PRIMARY HYPERTENSION: ICD-10-CM

## 2025-07-07 DIAGNOSIS — Z78.0 OSTEOPENIA AFTER MENOPAUSE: ICD-10-CM

## 2025-07-07 DIAGNOSIS — R79.9 ABNORMAL FINDING OF BLOOD CHEMISTRY, UNSPECIFIED: ICD-10-CM

## 2025-07-07 DIAGNOSIS — F51.04 PSYCHOPHYSIOLOGICAL INSOMNIA: ICD-10-CM

## 2025-07-07 DIAGNOSIS — R11.0 NAUSEA: ICD-10-CM

## 2025-07-07 DIAGNOSIS — Z63.6 CAREGIVER STRESS: ICD-10-CM

## 2025-07-07 DIAGNOSIS — Z00.00 ENCOUNTER FOR ANNUAL WELLNESS VISIT (AWV) IN MEDICARE PATIENT: Primary | ICD-10-CM

## 2025-07-07 DIAGNOSIS — M85.80 OSTEOPENIA AFTER MENOPAUSE: ICD-10-CM

## 2025-07-07 RX ORDER — PANTOPRAZOLE SODIUM 40 MG/1
40 TABLET, DELAYED RELEASE ORAL DAILY
Qty: 90 TABLET | Refills: 0 | Status: SHIPPED | OUTPATIENT
Start: 2025-07-07

## 2025-07-07 SDOH — SOCIAL STABILITY - SOCIAL INSECURITY: DEPENDENT RELATIVE NEEDING CARE AT HOME: Z63.6

## 2025-07-07 NOTE — PROGRESS NOTES
The ABCs of the Annual Wellness Visit  Subsequent Medicare Wellness Visit    Subjective    Caryl Rae is a 84 y.o. female who presents for a Subsequent Medicare Wellness Visit.    The following portions of the patient's history were reviewed and   updated as appropriate: allergies, current medications, past family history, past medical history, past social history, past surgical history, and problem list.    Compared to one year ago, the patient feels her physical   health is the same.    Compared to one year ago, the patient feels her mental   health is the same.    Recent Hospitalizations:  This patient has had a [facility] admission record on file within the last 365 days.    Current Medical Providers:  Patient Care Team:  Kiera Apodaca APRN as PCP - General (Nurse Practitioner)  Neeraj Clemens MD as Consulting Physician (Pulmonary Disease)  Kellie Martin APRN as Nurse Practitioner (Gastroenterology)  Laurence Castro PA-C as Physician Assistant (Physician Assistant)    Outpatient Medications Prior to Visit   Medication Sig Dispense Refill    acetaminophen (TYLENOL) 500 MG tablet Take 1 tablet by mouth Every 6 (Six) Hours As Needed for Mild Pain.      amLODIPine (NORVASC) 5 MG tablet Take 1 tablet by mouth Daily. 30 tablet 1    Multiple Vitamins-Minerals (Ocuvite Adult 50+) capsule Take 1 capsule by mouth Every Morning.      PARoxetine (PAXIL) 20 MG tablet Take 1.5 tablets by mouth every night at bedtime. 135 tablet 1    traZODone (DESYREL) 50 MG tablet Take 0.5-1 tablets by mouth Every Night. 90 tablet 1    Vitamin D, Cholecalciferol, 25 MCG (1000 UT) capsule Take 1 capsule by mouth Every Morning.      omeprazole (priLOSEC) 40 MG capsule Take 1 capsule by mouth Every Morning. 90 capsule 1    rosuvastatin (CRESTOR) 10 MG tablet Take 1 tablet by mouth Every Morning.       No facility-administered medications prior to visit.       No opioid medication identified on active medication  "list. I have reviewed chart for other potential  high risk medication/s and harmful drug interactions in the elderly.        Aspirin is not on active medication list.  Aspirin use is not indicated based on review of current medical condition/s. Risk of harm outweighs potential benefits.  .    Patient Active Problem List   Diagnosis    Hip joint replacement status    Chest pain    Gastroesophageal reflux disease    Hoarseness     Advance Care Planning   Advance Care Planning     Advance Directive is not on file.  ACP discussion was held with the patient during this visit. Patient has an advance directive (not in EMR), copy requested.     Objective    Vitals:    25 1422   BP: 118/58   Pulse: 84   Temp: 95.5 °F (35.3 °C)   TempSrc: Temporal   SpO2: 96%   Weight: 63.7 kg (140 lb 6.4 oz)   PainSc: 1      Estimated body mass index is 26.53 kg/m² as calculated from the following:    Height as of 6/10/25: 154.9 cm (61\").    Weight as of this encounter: 63.7 kg (140 lb 6.4 oz).           Does the patient have evidence of cognitive impairment? No    Lab Results   Component Value Date    CHLPL 211 (H) 2025    TRIG 129 2025    HDL 71 2025     (H) 2025    VLDL 22 2025    HGBA1C 5.7 (H) 2025        HEALTH RISK ASSESSMENT    Smoking Status:  Social History     Tobacco Use   Smoking Status Never    Passive exposure: Never   Smokeless Tobacco Never     Alcohol Consumption:  Social History     Substance and Sexual Activity   Alcohol Use Never     Fall Risk Screen:    STEADI Fall Risk Assessment was completed, and patient is at MODERATE risk for falls. Assessment completed on:2025    Depression Screenin/7/2025     3:00 PM   PHQ-2/PHQ-9 Depression Screening   Little interest or pleasure in doing things Several days   Feeling down, depressed, or hopeless Several days   Trouble falling or staying asleep, or sleeping too much Several days   Feeling tired or having little energy " Several days   Poor appetite or overeating Not at all   Feeling bad about yourself - or that you are a failure or have let yourself or your family down Not at all   Trouble concentrating on things, such as reading the newspaper or watching television Not at all   Moving or speaking so slowly that other people could have noticed? Or the opposite - being so fidgety or restless that you have been moving around a lot more than usual. Not at all   Thoughts that you would be better off dead or hurting yourself in some way Not at all   Patient Health Questionnaire-9 Score 4   How difficult have these problems made it for you to do your work, take care of things at home, or get along with other people? Somewhat difficult       Health Habits and Functional and Cognitive Screenin/7/2025     2:18 PM   Functional & Cognitive Status   Do you have difficulty preparing food and eating? Yes   Do you have difficulty bathing yourself, getting dressed or grooming yourself? Yes   Do you have difficulty using the toilet? Yes   Do you have difficulty moving around from place to place? Yes   Do you have trouble with steps or getting out of a bed or a chair? Yes   Current Diet Other   Dental Exam Up to date   Eye Exam Up to date   Exercise (times per week) 3 times per week   Current Exercises Include Walking   Do you need help using the phone?  No   Are you deaf or do you have serious difficulty hearing?  No   Do you need help to go to places out of walking distance? No   Do you need help shopping? No   Do you need help preparing meals?  No   Do you need help with housework?  No   Do you need help with laundry? No   Do you need help taking your medications? No   Do you need help managing money? No   Do you ever drive or ride in a car without wearing a seat belt? No   Have you felt unusual fatigue (could be tiredness), stress, anger or loneliness in the last month? No   Who do you live with? Spouse   If you need help, do you have  trouble finding someone available to you? No   Have you been bothered in the last four weeks by sexual problems? No   Do you have difficulty concentrating, remembering or making decisions? No       Age-appropriate Screening Schedule:  Refer to the list below for future screening recommendations based on patient's age, sex and/or medical conditions. Orders for these recommended tests are listed in the plan section. The patient has been provided with a written plan.    Health Maintenance   Topic Date Due    COVID-19 Vaccine (8 - 2024-25 season) 08/29/2025 (Originally 3/5/2025)    ZOSTER VACCINE (2 of 2) 08/29/2025 (Originally 3/9/2020)    DXA SCAN  09/25/2025 (Originally 5/30/2021)    INFLUENZA VACCINE  10/01/2025    ANNUAL WELLNESS VISIT  07/07/2026    LIPID PANEL  07/07/2026    TDAP/TD VACCINES (2 - Td or Tdap) 04/24/2035    RSV Vaccine - Adults  Completed    Pneumococcal Vaccine 50+  Completed                  CMS Preventative Services Quick Reference  Risk Factors Identified During Encounter  None Identified  The above risks/problems have been discussed with the patient.  Pertinent information has been shared with the patient in the After Visit Summary.  An After Visit Summary and PPPS were made available to the patient.    Follow Up:   Next Medicare Wellness visit to be scheduled in 1 year.       Additional E&M Note during same encounter follows:  Patient has multiple medical problems which are significant and separately identifiable that require additional work above and beyond the Medicare Wellness Visit.      Chief Complaint  Medicare Wellness-subsequent    Subjective        HPI  Caryl Rae is also being seen today for AWV    History of Present Illness  The patient presents for a wellness visit.    She has been experiencing morning nausea and intermittent dizziness for the past week. The dizziness occurs both at rest and upon standing, but is not triggered by head movements. She describes the  dizziness as a sensation of something being wrong in her head, which also causes leg weakness. She reports no new medications, foods, or activities. Her fluid intake is primarily tea, lemonade, and coffee, with less water consumption. She experienced dizziness while walking from her car to the clinic today. She does not attribute the dizziness to trazodone, which she finds beneficial for sleep. She reports no diarrhea or vomiting.     On 04/24/2025, she had a fall resulting in a skull fracture and a T12 compression fracture, which is healing well according to her spine specialist. She was initially fitted with a back brace but found it uncomfortable and bulky. She reports no shortness of breath, chest pain, or palpitations. She has not scheduled a cardiology appointment despite being advised to do so. She reports no ear or throat issues, allergies, skin changes, stool changes, blood in stool, muscle aches, or new pains. She reports no headaches, except those caused by her dental aligners. She has an advanced directive and living will in place. She believes her memory is intact. She has not had any overnight hospital stays. She has a history of anemia and B12 deficiency.    She has been managing acid reflux with daily omeprazole and occasional Pepcid at bedtime if she consumes pizza or Mexican food. She has been on omeprazole for about 2 years. She also takes Paxil 30 mg and trazodone at night, which she finds helpful. She reports no improvement in her cough, which is attributed to GERD. She sees a pulmonologist every 6 months for this issue.    Her blood pressure readings at home are typically around 115-118/60-70. She takes amlodipine 5 mg in the morning.    She has been taking trazodone for sleep and requests a refill.    She has severe cervical arthritis, causing neck pain that radiates into her shoulders.    SOCIAL HISTORY  She is a retired nurse.       Review of Systems   Constitutional:  Positive for fatigue.    HENT:  Negative for dental problem, hearing loss and trouble swallowing.    Respiratory:  Negative for cough and shortness of breath.    Cardiovascular:  Negative for chest pain and palpitations.   Gastrointestinal:  Negative for abdominal pain and blood in stool.   Genitourinary:  Negative for difficulty urinating, hematuria and vaginal bleeding.   Neurological:  Negative for weakness.       Objective   Vital Signs:  /58   Pulse 84   Temp 95.5 °F (35.3 °C) (Temporal)   Wt 63.7 kg (140 lb 6.4 oz)   SpO2 96%   BMI 26.53 kg/m²     Physical Exam  Vitals and nursing note reviewed.   Constitutional:       General: She is not in acute distress.     Appearance: She is well-developed. She is not ill-appearing.   HENT:      Head: Normocephalic and atraumatic.      Right Ear: Tympanic membrane, ear canal and external ear normal.      Left Ear: Ear canal and external ear normal. There is impacted cerumen.      Mouth/Throat:      Mouth: Mucous membranes are moist.      Pharynx: Uvula midline. No posterior oropharyngeal erythema.   Eyes:      General: No scleral icterus.        Right eye: No discharge.         Left eye: No discharge.      Conjunctiva/sclera: Conjunctivae normal.      Pupils: Pupils are equal, round, and reactive to light.   Neck:      Thyroid: No thyromegaly.   Cardiovascular:      Rate and Rhythm: Normal rate and regular rhythm.      Heart sounds: Normal heart sounds.   Pulmonary:      Effort: Pulmonary effort is normal.      Breath sounds: Normal breath sounds.   Abdominal:      General: Bowel sounds are normal. There is no distension.      Palpations: Abdomen is soft.      Tenderness: There is no abdominal tenderness.   Musculoskeletal:         General: No deformity.      Cervical back: Neck supple.      Comments: Gait smooth and steady   Lymphadenopathy:      Cervical: No cervical adenopathy.   Skin:     General: Skin is warm and dry.   Neurological:      General: No focal deficit present.       Mental Status: She is alert and oriented to person, place, and time.   Psychiatric:         Mood and Affect: Mood normal.         Behavior: Behavior normal.         Thought Content: Thought content normal.         Physical Exam  Ears: Excessive cerumen in the left ear.  Respiratory: Clear to auscultation, no wheezing, rales or rhonchi.  Cardiovascular: Regular rate and rhythm, no murmurs, rubs, or gallops.  Gastrointestinal: Soft, no tenderness, no distention, no masses.                Results  Labs   - CBC: Anemic    Imaging   - CT of the head: 04/24/2025, Negative for acute  - X-ray spine/CT L-spine: 4/24/2025-compression fracture T12, age-indeterminate compression deformities of L1 and L2    Diagnostic Testing   - EGD 3/31/2025: Hiatal hernia, mucus likely from postnasal drip, gastritis       ECG 12 Lead    Date/Time: 7/7/2025 4:13 PM  Performed by: Kiera Apodaca APRN    Authorized by: Kiera Apodaca APRN  Comparison: compared with previous ECG from 10/2/2024  Similar to previous ECG  Rhythm: sinus rhythm  Rate: normal  BPM: 63  Conduction: conduction normal  ST Segments: ST segments normal  T Waves: T waves normal  QRS axis: normal  Other: no other findings    Clinical impression: normal ECG              Assessment and Plan   Diagnoses and all orders for this visit:    1. Encounter for annual wellness visit (AWV) in Medicare patient (Primary)    2. Dizziness  -     Hemoglobin A1c  -     ECG 12 Lead    3. Nausea  -     pantoprazole (Protonix) 40 MG EC tablet; Take 1 tablet by mouth Daily.  Dispense: 90 tablet; Refill: 0  -     Hemoglobin A1c    4. Hoarseness  -     pantoprazole (Protonix) 40 MG EC tablet; Take 1 tablet by mouth Daily.  Dispense: 90 tablet; Refill: 0    5. Gastroesophageal reflux disease, unspecified whether esophagitis present  -     pantoprazole (Protonix) 40 MG EC tablet; Take 1 tablet by mouth Daily.  Dispense: 90 tablet; Refill: 0  -     CBC (No Diff)    6. Primary hypertension  -      CBC (No Diff)  -     Comprehensive Metabolic Panel    7. Mixed hyperlipidemia  -     Lipid Panel With LDL / HDL Ratio    8. Psychophysiological insomnia  -     TSH Rfx On Abnormal To Free T4    9. Anemia, unspecified type  -     Iron Profile w/o Ferritin  -     Ferritin    10. Abnormal finding of blood chemistry, unspecified  -     Hemoglobin A1c    11. Abnormal findings on diagnostic imaging of other specified body structures  -     TSH Rfx On Abnormal To Free T4    12. Caregiver stress    13. Osteopenia after menopause  -     Vitamin D,25-Hydroxy        Assessment & Plan  1. Dizziness.  - Blood pressure readings are slightly low, which could be contributing to dizziness.  - Relative low sodium levels, especially in hot weather conditions, may be a factor. Mild anemia and potential dehydration could also contribute.  - An EKG performed today for comparison with previous results showed NSR.   -Blood work will also be ordered.  - Advised to consume sugar-free Gatorade or Liquid I.V. three times a week. If nausea, cough, and hoarseness persist, she should inform us.    2. Nausea-may be due to GERD  - Recent symptom occurring mostly in the morning, separate from dizziness.  - Advised to monitor symptoms and report any changes.    3. Hypertension.  - Blood pressure readings are slightly low.  - Dosage of amlodipine will be reduced to 2.5 mg for 2 weeks to see if this alleviates dizziness.  - Advised to monitor blood pressure and report readings in 2 weeks.    4. Gastroesophageal Reflux Disease (GERD).  - On omeprazole for 2 years without significant improvement in cough and hoarseness.  - Medication will be switched from omeprazole to pantoprazole. Can continue to use Pepcid for breakthrough symptoms.  - If nausea, cough, and hoarseness persist, she should inform us.    5.  Insomnia  - Trazodone has been effective in helping her sleep better.  - Prescription for trazodone will be provided.    6. Anemia.  - Mildly  anemic 9 months ago.  - Blood work will be ordered to check current anemia status and vitamin D levels.  -Could also be causing some of her mild dizziness    7.  Caregiver stress  -Total care of chronically ill spouse  -Discussed self-care while caring for her ill spouse  -Processed grief    8.  Compression fracture T12  - Followed by neurosurgery  - Seems to be healing  - May be contributing to dizzy feelings     Appropriate health maintenance and prevention topics specific for this patient were discussed today.  Additionally, health goals, and health concerns addressed as appropriate.  Pt was encouraged to stay up to date on recommended screenings and vaccines based on USPSTF guidelines.            Follow Up   No follow-ups on file.  Patient was given instructions and counseling regarding her condition or for health maintenance advice. Please see specific information pulled into the AVS if appropriate.    Patient or patient representative verbalized consent for the use of Ambient Listening during the visit with  DURGA Kline for chart documentation. 7/22/2025  06:13 EDT

## 2025-07-08 LAB
25(OH)D3+25(OH)D2 SERPL-MCNC: 32.4 NG/ML (ref 30–100)
ALBUMIN SERPL-MCNC: 4.4 G/DL (ref 3.7–4.7)
ALP SERPL-CCNC: 64 IU/L (ref 44–121)
ALT SERPL-CCNC: 12 IU/L (ref 0–32)
AST SERPL-CCNC: 14 IU/L (ref 0–40)
BILIRUB SERPL-MCNC: 0.3 MG/DL (ref 0–1.2)
BUN SERPL-MCNC: 19 MG/DL (ref 8–27)
BUN/CREAT SERPL: 19 (ref 12–28)
CALCIUM SERPL-MCNC: 9.6 MG/DL (ref 8.7–10.3)
CHLORIDE SERPL-SCNC: 105 MMOL/L (ref 96–106)
CHOLEST SERPL-MCNC: 211 MG/DL (ref 100–199)
CO2 SERPL-SCNC: 21 MMOL/L (ref 20–29)
CREAT SERPL-MCNC: 0.98 MG/DL (ref 0.57–1)
EGFRCR SERPLBLD CKD-EPI 2021: 57 ML/MIN/1.73
ERYTHROCYTE [DISTWIDTH] IN BLOOD BY AUTOMATED COUNT: 14.9 % (ref 11.7–15.4)
FERRITIN SERPL-MCNC: 45 NG/ML (ref 15–150)
GLOBULIN SER CALC-MCNC: 2.7 G/DL (ref 1.5–4.5)
GLUCOSE SERPL-MCNC: 96 MG/DL (ref 70–99)
HBA1C MFR BLD: 5.7 % (ref 4.8–5.6)
HCT VFR BLD AUTO: 38.8 % (ref 34–46.6)
HDLC SERPL-MCNC: 71 MG/DL
HGB BLD-MCNC: 12.4 G/DL (ref 11.1–15.9)
IRON SATN MFR SERPL: 21 % (ref 15–55)
IRON SERPL-MCNC: 64 UG/DL (ref 27–139)
LDLC SERPL CALC-MCNC: 118 MG/DL (ref 0–99)
LDLC/HDLC SERPL: 1.7 RATIO (ref 0–3.2)
MCH RBC QN AUTO: 33 PG (ref 26.6–33)
MCHC RBC AUTO-ENTMCNC: 32 G/DL (ref 31.5–35.7)
MCV RBC AUTO: 103 FL (ref 79–97)
PLATELET # BLD AUTO: 239 X10E3/UL (ref 150–450)
POTASSIUM SERPL-SCNC: 4.4 MMOL/L (ref 3.5–5.2)
PROT SERPL-MCNC: 7.1 G/DL (ref 6–8.5)
RBC # BLD AUTO: 3.76 X10E6/UL (ref 3.77–5.28)
SODIUM SERPL-SCNC: 142 MMOL/L (ref 134–144)
TIBC SERPL-MCNC: 303 UG/DL (ref 250–450)
TRIGL SERPL-MCNC: 129 MG/DL (ref 0–149)
TSH SERPL DL<=0.005 MIU/L-ACNC: 1.91 UIU/ML (ref 0.45–4.5)
UIBC SERPL-MCNC: 239 UG/DL (ref 118–369)
VLDLC SERPL CALC-MCNC: 22 MG/DL (ref 5–40)
WBC # BLD AUTO: 6.9 X10E3/UL (ref 3.4–10.8)

## 2025-07-15 ENCOUNTER — TELEPHONE (OUTPATIENT)
Dept: FAMILY MEDICINE CLINIC | Facility: CLINIC | Age: 85
End: 2025-07-15
Payer: MEDICARE

## (undated) DEVICE — CANN O2 ETCO2 FITS ALL CONN CO2 SMPL A/ 7IN DISP LF

## (undated) DEVICE — CATH DIAG IMPULSE FL3.5 5F 100CM

## (undated) DEVICE — GLIDESHEATH SLENDER STAINLESS STEEL KIT: Brand: GLIDESHEATH SLENDER

## (undated) DEVICE — PK CATH CARD 40

## (undated) DEVICE — DGW .035 FC J3MM 260CM TEF: Brand: EMERALD

## (undated) DEVICE — TR BAND RADIAL ARTERY COMPRESSION DEVICE: Brand: TR BAND

## (undated) DEVICE — CATH DIAG IMPULSE MPA2 SH 5F 100CM

## (undated) DEVICE — SINGLE-USE BIOPSY FORCEPS: Brand: RADIAL JAW 4

## (undated) DEVICE — GOWN ISOL W/THUMB UNIV BLU BX/15

## (undated) DEVICE — DEV INDEFLATOR P/N 580289

## (undated) DEVICE — CATH DIAG IMPULSE FR4 5F 100CM

## (undated) DEVICE — KT MANIFLD CARDIAC

## (undated) DEVICE — KT ORCA ORCAPOD DISP STRL

## (undated) DEVICE — CATH VENT MIV RADL PIG ST TIP 5F 110CM

## (undated) DEVICE — ADAPT CLN SCPE ENDO PORPOISE BX/50 DISP

## (undated) DEVICE — VIAL FORMLN CAP 10PCT 20ML

## (undated) DEVICE — BLCK/BITE BLOX W/DENTL/RIM W/STRAP 54F

## (undated) DEVICE — Device

## (undated) DEVICE — FEMORAL ENTRY ANGIOGRAPHY SHIELD-YELLOW: Brand: RADPAD

## (undated) DEVICE — GW PRESSWR AERIS .014IN 175CM

## (undated) DEVICE — GOWN PROC ENDOARMOR GI LVL3 HY/SHLD UNIV

## (undated) DEVICE — FLEX ADVANTAGE 1500CC: Brand: FLEX ADVANTAGE

## (undated) DEVICE — 6F .070 XB LAD 3.5 100CM: Brand: VISTA BRITE TIP